# Patient Record
Sex: FEMALE | Race: BLACK OR AFRICAN AMERICAN | NOT HISPANIC OR LATINO | URBAN - METROPOLITAN AREA
[De-identification: names, ages, dates, MRNs, and addresses within clinical notes are randomized per-mention and may not be internally consistent; named-entity substitution may affect disease eponyms.]

---

## 2017-04-15 ENCOUNTER — EMERGENCY (EMERGENCY)
Facility: HOSPITAL | Age: 62
LOS: 1 days | Discharge: HOME | End: 2017-04-15
Attending: EMERGENCY MEDICINE
Payer: MEDICAID

## 2017-04-15 VITALS
HEART RATE: 76 BPM | DIASTOLIC BLOOD PRESSURE: 78 MMHG | SYSTOLIC BLOOD PRESSURE: 118 MMHG | TEMPERATURE: 99 F | OXYGEN SATURATION: 100 % | RESPIRATION RATE: 16 BRPM | WEIGHT: 199.96 LBS | HEIGHT: 67 IN

## 2017-04-15 DIAGNOSIS — R07.81 PLEURODYNIA: ICD-10-CM

## 2017-04-15 DIAGNOSIS — Z79.899 OTHER LONG TERM (CURRENT) DRUG THERAPY: ICD-10-CM

## 2017-04-15 DIAGNOSIS — R05 COUGH: ICD-10-CM

## 2017-04-15 DIAGNOSIS — R07.9 CHEST PAIN, UNSPECIFIED: ICD-10-CM

## 2017-04-15 DIAGNOSIS — Z98.890 OTHER SPECIFIED POSTPROCEDURAL STATES: ICD-10-CM

## 2017-04-15 DIAGNOSIS — R06.00 DYSPNEA, UNSPECIFIED: ICD-10-CM

## 2017-04-15 LAB
ALBUMIN SERPL ELPH-MCNC: 4.6 G/DL — SIGNIFICANT CHANGE UP (ref 3.3–5.2)
ALP SERPL-CCNC: 97 U/L — SIGNIFICANT CHANGE UP (ref 40–120)
ALT FLD-CCNC: 19 U/L — SIGNIFICANT CHANGE UP
ANION GAP SERPL CALC-SCNC: 12 MMOL/L — SIGNIFICANT CHANGE UP (ref 5–17)
AST SERPL-CCNC: 25 U/L — SIGNIFICANT CHANGE UP
BASOPHILS # BLD AUTO: 0 K/UL — SIGNIFICANT CHANGE UP (ref 0–0.2)
BASOPHILS NFR BLD AUTO: 0.5 % — SIGNIFICANT CHANGE UP (ref 0–2)
BILIRUB SERPL-MCNC: 0.2 MG/DL — LOW (ref 0.4–2)
BUN SERPL-MCNC: 18 MG/DL — SIGNIFICANT CHANGE UP (ref 8–20)
CALCIUM SERPL-MCNC: 9.3 MG/DL — SIGNIFICANT CHANGE UP (ref 8.6–10.2)
CHLORIDE SERPL-SCNC: 100 MMOL/L — SIGNIFICANT CHANGE UP (ref 98–107)
CK SERPL-CCNC: 305 U/L — HIGH (ref 25–170)
CO2 SERPL-SCNC: 28 MMOL/L — SIGNIFICANT CHANGE UP (ref 22–29)
CREAT SERPL-MCNC: 0.85 MG/DL — SIGNIFICANT CHANGE UP (ref 0.5–1.3)
EOSINOPHIL # BLD AUTO: 0.1 K/UL — SIGNIFICANT CHANGE UP (ref 0–0.5)
EOSINOPHIL NFR BLD AUTO: 1.6 % — SIGNIFICANT CHANGE UP (ref 0–6)
GLUCOSE SERPL-MCNC: 102 MG/DL — SIGNIFICANT CHANGE UP (ref 70–115)
HCT VFR BLD CALC: 33.5 % — LOW (ref 37–47)
HGB BLD-MCNC: 11.3 G/DL — LOW (ref 12–16)
LYMPHOCYTES # BLD AUTO: 1 K/UL — SIGNIFICANT CHANGE UP (ref 1–4.8)
LYMPHOCYTES # BLD AUTO: 17.2 % — LOW (ref 20–55)
MCHC RBC-ENTMCNC: 28 PG — SIGNIFICANT CHANGE UP (ref 27–31)
MCHC RBC-ENTMCNC: 33.7 G/DL — SIGNIFICANT CHANGE UP (ref 32–36)
MCV RBC AUTO: 83.1 FL — SIGNIFICANT CHANGE UP (ref 81–99)
MONOCYTES # BLD AUTO: 0.5 K/UL — SIGNIFICANT CHANGE UP (ref 0–0.8)
MONOCYTES NFR BLD AUTO: 8.9 % — SIGNIFICANT CHANGE UP (ref 3–10)
NEUTROPHILS # BLD AUTO: 4.4 K/UL — SIGNIFICANT CHANGE UP (ref 1.8–8)
NEUTROPHILS NFR BLD AUTO: 71.8 % — SIGNIFICANT CHANGE UP (ref 37–73)
PLATELET # BLD AUTO: 293 K/UL — SIGNIFICANT CHANGE UP (ref 150–400)
POTASSIUM SERPL-MCNC: 3.6 MMOL/L — SIGNIFICANT CHANGE UP (ref 3.5–5.3)
POTASSIUM SERPL-SCNC: 3.6 MMOL/L — SIGNIFICANT CHANGE UP (ref 3.5–5.3)
PROT SERPL-MCNC: 7.8 G/DL — SIGNIFICANT CHANGE UP (ref 6.6–8.7)
RBC # BLD: 4.03 M/UL — LOW (ref 4.4–5.2)
RBC # FLD: 15.2 % — SIGNIFICANT CHANGE UP (ref 11–15.6)
SODIUM SERPL-SCNC: 140 MMOL/L — SIGNIFICANT CHANGE UP (ref 135–145)
TROPONIN T SERPL-MCNC: <0.01 NG/ML — SIGNIFICANT CHANGE UP (ref 0–0.06)
WBC # BLD: 6.1 K/UL — SIGNIFICANT CHANGE UP (ref 4.8–10.8)
WBC # FLD AUTO: 6.1 K/UL — SIGNIFICANT CHANGE UP (ref 4.8–10.8)

## 2017-04-15 PROCEDURE — 71020: CPT | Mod: 26

## 2017-04-15 PROCEDURE — 99285 EMERGENCY DEPT VISIT HI MDM: CPT | Mod: 25

## 2017-04-15 PROCEDURE — 93010 ELECTROCARDIOGRAM REPORT: CPT

## 2017-04-15 RX ORDER — KETOROLAC TROMETHAMINE 30 MG/ML
30 SYRINGE (ML) INJECTION ONCE
Qty: 0 | Refills: 0 | Status: DISCONTINUED | OUTPATIENT
Start: 2017-04-15 | End: 2017-04-15

## 2017-04-15 RX ORDER — SODIUM CHLORIDE 9 MG/ML
3 INJECTION INTRAMUSCULAR; INTRAVENOUS; SUBCUTANEOUS ONCE
Qty: 0 | Refills: 0 | Status: COMPLETED | OUTPATIENT
Start: 2017-04-15 | End: 2017-04-15

## 2017-04-15 RX ADMIN — SODIUM CHLORIDE 3 MILLILITER(S): 9 INJECTION INTRAMUSCULAR; INTRAVENOUS; SUBCUTANEOUS at 23:39

## 2017-04-15 RX ADMIN — Medication 30 MILLIGRAM(S): at 23:38

## 2017-04-15 RX ADMIN — Medication 100 MILLIGRAM(S): at 23:44

## 2017-04-15 NOTE — ED PROVIDER NOTE - PROGRESS NOTE DETAILS
CXR ROLAND, CE neg.  Pt improved with meds and stable for d/c.  Rx Ibu 600 mg and Tessalon Perles with f/u PMD/Dr. Sanches next 1-2 days

## 2017-04-15 NOTE — ED PROVIDER NOTE - NS ED MD SCRIBE ATTENDING SCRIBE SECTIONS
PAST MEDICAL/SURGICAL/SOCIAL HISTORY/CONSULTATIONS/SHIFT CHANGE/RESULTS/PHYSICAL EXAM/PROGRESS NOTE/INTAKE ASSESSMENT/SCREENINGS/VITAL SIGNS( Pullset)/HIV/HISTORY OF PRESENT ILLNESS/REVIEW OF SYSTEMS/DISPOSITION

## 2017-04-15 NOTE — ED ADULT NURSE NOTE - OBJECTIVE STATEMENT
Pt c/o bilat cp x 2 days.  Chest is tender to palp and worse w movement.  Pt reports worsening productive cough x 2 days.  No acute distress noted.

## 2017-04-15 NOTE — ED ADULT TRIAGE NOTE - CHIEF COMPLAINT QUOTE
generalized cp with cough x few days. pt has no other complaints at this time.  a and o x3. breathing even and unlabored.

## 2017-04-15 NOTE — ED PROVIDER NOTE - OBJECTIVE STATEMENT
60 y/o F with no cardiac history and no COPD presents to the ED c/o chest pain that onset 2 days ago. Pt states the chest pain is aggravated   after coughing and it radiates to her R shoulder. Pt states HARTLEY, yellow phlegm, throbbing R arm, being kept up at night. Pt denies sick contact, fever, chills, diaphoresis, leg edema. No other complaints at this time.

## 2017-04-16 VITALS
TEMPERATURE: 98 F | OXYGEN SATURATION: 100 % | RESPIRATION RATE: 14 BRPM | SYSTOLIC BLOOD PRESSURE: 113 MMHG | DIASTOLIC BLOOD PRESSURE: 70 MMHG | HEART RATE: 79 BPM

## 2017-04-16 LAB — CK MB CFR SERPL CALC: 2.3 NG/ML — SIGNIFICANT CHANGE UP (ref 0–6.7)

## 2017-04-16 PROCEDURE — 82550 ASSAY OF CK (CPK): CPT

## 2017-04-16 PROCEDURE — 96374 THER/PROPH/DIAG INJ IV PUSH: CPT

## 2017-04-16 PROCEDURE — 85027 COMPLETE CBC AUTOMATED: CPT

## 2017-04-16 PROCEDURE — 84484 ASSAY OF TROPONIN QUANT: CPT

## 2017-04-16 PROCEDURE — 80053 COMPREHEN METABOLIC PANEL: CPT

## 2017-04-16 PROCEDURE — 82553 CREATINE MB FRACTION: CPT

## 2017-04-16 PROCEDURE — 71046 X-RAY EXAM CHEST 2 VIEWS: CPT

## 2017-04-16 PROCEDURE — 93005 ELECTROCARDIOGRAM TRACING: CPT

## 2017-04-16 PROCEDURE — 99284 EMERGENCY DEPT VISIT MOD MDM: CPT | Mod: 25

## 2017-04-16 RX ORDER — IBUPROFEN 200 MG
1 TABLET ORAL
Qty: 20 | Refills: 0
Start: 2017-04-16 | End: 2017-04-21

## 2017-04-16 RX ADMIN — Medication 100 MILLIGRAM(S): at 03:37

## 2017-04-16 RX ADMIN — Medication 30 MILLIGRAM(S): at 00:30

## 2017-05-06 ENCOUNTER — EMERGENCY (EMERGENCY)
Facility: HOSPITAL | Age: 62
LOS: 1 days | Discharge: HOME | End: 2017-05-06
Attending: EMERGENCY MEDICINE
Payer: MEDICAID

## 2017-05-06 VITALS
TEMPERATURE: 98 F | HEIGHT: 67 IN | OXYGEN SATURATION: 100 % | WEIGHT: 199.96 LBS | SYSTOLIC BLOOD PRESSURE: 119 MMHG | DIASTOLIC BLOOD PRESSURE: 74 MMHG | RESPIRATION RATE: 18 BRPM | HEART RATE: 73 BPM

## 2017-05-06 VITALS
HEART RATE: 63 BPM | OXYGEN SATURATION: 99 % | SYSTOLIC BLOOD PRESSURE: 118 MMHG | RESPIRATION RATE: 20 BRPM | DIASTOLIC BLOOD PRESSURE: 83 MMHG | TEMPERATURE: 98 F

## 2017-05-06 DIAGNOSIS — S39.92XA UNSPECIFIED INJURY OF LOWER BACK, INITIAL ENCOUNTER: ICD-10-CM

## 2017-05-06 DIAGNOSIS — Z90.49 ACQUIRED ABSENCE OF OTHER SPECIFIED PARTS OF DIGESTIVE TRACT: ICD-10-CM

## 2017-05-06 DIAGNOSIS — S39.022A: ICD-10-CM
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
## 2017-05-06 DIAGNOSIS — Z79.899 OTHER LONG TERM (CURRENT) DRUG THERAPY: ICD-10-CM
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
## 2017-05-06 PROCEDURE — 99284 EMERGENCY DEPT VISIT MOD MDM: CPT

## 2017-05-06 PROCEDURE — 72100 X-RAY EXAM L-S SPINE 2/3 VWS: CPT | Mod: 26

## 2017-05-06 PROCEDURE — 99283 EMERGENCY DEPT VISIT LOW MDM: CPT | Mod: 25

## 2017-05-06 PROCEDURE — 96372 THER/PROPH/DIAG INJ SC/IM: CPT

## 2017-05-06 PROCEDURE — 72100 X-RAY EXAM L-S SPINE 2/3 VWS: CPT

## 2017-05-06 RX ORDER — METHOCARBAMOL 500 MG/1
1000 TABLET, FILM COATED ORAL ONCE
Qty: 0 | Refills: 0 | Status: COMPLETED | OUTPATIENT
Start: 2017-05-06 | End: 2017-05-06

## 2017-05-06 RX ORDER — KETOROLAC TROMETHAMINE 30 MG/ML
60 SYRINGE (ML) INJECTION ONCE
Qty: 0 | Refills: 0 | Status: DISCONTINUED | OUTPATIENT
Start: 2017-05-06 | End: 2017-05-06

## 2017-05-06 RX ORDER — METHOCARBAMOL 500 MG/1
2 TABLET, FILM COATED ORAL
Qty: 24 | Refills: 0
Start: 2017-05-06 | End: 2017-05-10

## 2017-05-06 RX ADMIN — Medication 60 MILLIGRAM(S): at 18:50

## 2017-05-06 RX ADMIN — Medication 40 MILLIGRAM(S): at 21:17

## 2017-05-06 RX ADMIN — Medication 60 MILLIGRAM(S): at 19:42

## 2017-05-06 RX ADMIN — METHOCARBAMOL 1000 MILLIGRAM(S): 500 TABLET, FILM COATED ORAL at 18:50

## 2017-05-06 NOTE — ED ADULT TRIAGE NOTE - CHIEF COMPLAINT QUOTE
Patient arrived on stretcher via EMS, awake, alert, and oriented times 3, breathing unlabored.  As per patient, "I bent over and heard a crack in my lower back and I am now having a lower back spasm"  patient unable to walk due to pain

## 2017-05-06 NOTE — ED ADULT NURSE NOTE - OBJECTIVE STATEMENT
Patient reports when bending over she felt pop in lower back and now cant walk. Patient reports severe lower back pain.

## 2017-05-06 NOTE — ED PROVIDER NOTE - OBJECTIVE STATEMENT
Pt presents with lower back pain. She bent over to get something from the fridge and as she stool up felt a crack in her lower back area. She now has spasms that are localized to that area. No leg weakness, no bladder or bowel issues.

## 2017-05-06 NOTE — ED PROVIDER NOTE - NS ED ROS FT
Review of Systems:  	•	CONSTITUTIONAL : no fever or weight change  	•	SKIN : no rash  	•	HEMATOLOGIC : no petechia, no bruising  	•	EYES : no eye pain, no blurred vision  	•	ENT : no change in hearing, no sore throat  	•	RESPIRATORY : no shortness of breath, no cough  	•	CARDIAC : no chest pain, no palpitations  	•	GI : no abd pain, no nausea, no vomiting, no diarrhea, no constipation, no bleeding   	•	MUSCULOSKELETAL : + back pain  	•	NEUROLOGIC : no weakness, no headache, no anesthesia, no paresthesias

## 2017-05-07 RX ORDER — OXYCODONE AND ACETAMINOPHEN 5; 325 MG/1; MG/1
1 TABLET ORAL
Qty: 6 | Refills: 0
Start: 2017-05-07 | End: 2017-05-09

## 2018-01-01 NOTE — ED PROVIDER NOTE - NEUROLOGICAL, MLM
Alert and oriented, no focal deficits, no motor or sensory deficits. I will STOP taking the medications listed below when I get home from the hospital:  None

## 2018-01-19 ENCOUNTER — APPOINTMENT (OUTPATIENT)
Dept: GASTROENTEROLOGY | Facility: CLINIC | Age: 63
End: 2018-01-19
Payer: COMMERCIAL

## 2018-01-19 VITALS
SYSTOLIC BLOOD PRESSURE: 130 MMHG | WEIGHT: 195 LBS | BODY MASS INDEX: 30.61 KG/M2 | HEIGHT: 67 IN | HEART RATE: 80 BPM | DIASTOLIC BLOOD PRESSURE: 82 MMHG | RESPIRATION RATE: 16 BRPM | OXYGEN SATURATION: 100 %

## 2018-01-19 DIAGNOSIS — F55.2 ABUSE OF LAXATIVES: ICD-10-CM

## 2018-01-19 PROCEDURE — 99203 OFFICE O/P NEW LOW 30 MIN: CPT

## 2018-01-19 RX ORDER — SERTRALINE HYDROCHLORIDE 50 MG/1
50 TABLET, FILM COATED ORAL
Qty: 45 | Refills: 0 | Status: ACTIVE | COMMUNITY
Start: 2017-12-05

## 2018-01-19 RX ORDER — POLYETHYLENE GLYCOL 3350, SODIUM SULFATE, SODIUM CHLORIDE, POTASSIUM CHLORIDE, ASCORBIC ACID, SODIUM ASCORBATE 7.5-2.691G
100 KIT ORAL
Qty: 1 | Refills: 0 | Status: ACTIVE | COMMUNITY
Start: 2018-01-19 | End: 1900-01-01

## 2018-01-22 LAB
ANION GAP SERPL CALC-SCNC: 15 MMOL/L
BASOPHILS # BLD AUTO: 0.04 K/UL
BASOPHILS NFR BLD AUTO: 0.8 %
BUN SERPL-MCNC: 14 MG/DL
CALCIUM SERPL-MCNC: 9.7 MG/DL
CHLORIDE SERPL-SCNC: 103 MMOL/L
CO2 SERPL-SCNC: 25 MMOL/L
CREAT SERPL-MCNC: 0.82 MG/DL
EOSINOPHIL # BLD AUTO: 0.03 K/UL
EOSINOPHIL NFR BLD AUTO: 0.6 %
GLUCOSE SERPL-MCNC: 85 MG/DL
HCT VFR BLD CALC: 37.4 %
HGB BLD-MCNC: 11.8 G/DL
IMM GRANULOCYTES NFR BLD AUTO: 0.4 %
INR PPP: 1.03 RATIO
LYMPHOCYTES # BLD AUTO: 1.64 K/UL
LYMPHOCYTES NFR BLD AUTO: 32.3 %
MAN DIFF?: NORMAL
MCHC RBC-ENTMCNC: 27.1 PG
MCHC RBC-ENTMCNC: 31.6 GM/DL
MCV RBC AUTO: 85.8 FL
MONOCYTES # BLD AUTO: 0.39 K/UL
MONOCYTES NFR BLD AUTO: 7.7 %
NEUTROPHILS # BLD AUTO: 2.96 K/UL
NEUTROPHILS NFR BLD AUTO: 58.2 %
PLATELET # BLD AUTO: 297 K/UL
POTASSIUM SERPL-SCNC: 4.6 MMOL/L
PT BLD: 11.6 SEC
RBC # BLD: 4.36 M/UL
RBC # FLD: 15.2 %
SODIUM SERPL-SCNC: 143 MMOL/L
WBC # FLD AUTO: 5.08 K/UL

## 2018-02-03 ENCOUNTER — TRANSCRIPTION ENCOUNTER (OUTPATIENT)
Age: 63
End: 2018-02-03

## 2018-05-15 ENCOUNTER — APPOINTMENT (OUTPATIENT)
Dept: GASTROENTEROLOGY | Facility: GI CENTER | Age: 63
End: 2018-05-15
Payer: COMMERCIAL

## 2018-05-15 ENCOUNTER — OUTPATIENT (OUTPATIENT)
Dept: OUTPATIENT SERVICES | Facility: HOSPITAL | Age: 63
LOS: 1 days | End: 2018-05-15
Payer: MEDICAID

## 2018-05-15 DIAGNOSIS — Z12.11 ENCOUNTER FOR SCREENING FOR MALIGNANT NEOPLASM OF COLON: ICD-10-CM

## 2018-05-15 PROCEDURE — G0121: CPT

## 2018-05-15 PROCEDURE — 45378 DIAGNOSTIC COLONOSCOPY: CPT

## 2020-01-31 ENCOUNTER — EMERGENCY (EMERGENCY)
Facility: HOSPITAL | Age: 65
LOS: 1 days | Discharge: HOME | End: 2020-01-31
Attending: EMERGENCY MEDICINE
Payer: MEDICAID

## 2020-01-31 VITALS
OXYGEN SATURATION: 97 % | HEART RATE: 77 BPM | RESPIRATION RATE: 18 BRPM | DIASTOLIC BLOOD PRESSURE: 89 MMHG | SYSTOLIC BLOOD PRESSURE: 126 MMHG | TEMPERATURE: 98 F

## 2020-01-31 LAB
ALBUMIN SERPL ELPH-MCNC: 3.8 G/DL — SIGNIFICANT CHANGE UP (ref 3.3–5.2)
ALP SERPL-CCNC: 89 U/L — SIGNIFICANT CHANGE UP (ref 40–120)
ALT FLD-CCNC: <5 U/L — SIGNIFICANT CHANGE UP
ANION GAP SERPL CALC-SCNC: 14 MMOL/L — SIGNIFICANT CHANGE UP (ref 5–17)
AST SERPL-CCNC: 54 U/L — HIGH
BASOPHILS # BLD AUTO: 0.03 K/UL — SIGNIFICANT CHANGE UP (ref 0–0.2)
BASOPHILS NFR BLD AUTO: 0.5 % — SIGNIFICANT CHANGE UP (ref 0–2)
BILIRUB SERPL-MCNC: <0.2 MG/DL — LOW (ref 0.4–2)
BUN SERPL-MCNC: 15 MG/DL — SIGNIFICANT CHANGE UP (ref 8–20)
CALCIUM SERPL-MCNC: 9.2 MG/DL — SIGNIFICANT CHANGE UP (ref 8.6–10.2)
CHLORIDE SERPL-SCNC: 104 MMOL/L — SIGNIFICANT CHANGE UP (ref 98–107)
CO2 SERPL-SCNC: 25 MMOL/L — SIGNIFICANT CHANGE UP (ref 22–29)
CREAT SERPL-MCNC: 0.56 MG/DL — SIGNIFICANT CHANGE UP (ref 0.5–1.3)
EOSINOPHIL # BLD AUTO: 0.02 K/UL — SIGNIFICANT CHANGE UP (ref 0–0.5)
EOSINOPHIL NFR BLD AUTO: 0.3 % — SIGNIFICANT CHANGE UP (ref 0–6)
GLUCOSE SERPL-MCNC: 96 MG/DL — SIGNIFICANT CHANGE UP (ref 70–99)
HCT VFR BLD CALC: 36 % — SIGNIFICANT CHANGE UP (ref 34.5–45)
HGB BLD-MCNC: 11.6 G/DL — SIGNIFICANT CHANGE UP (ref 11.5–15.5)
IMM GRANULOCYTES NFR BLD AUTO: 0.8 % — SIGNIFICANT CHANGE UP (ref 0–1.5)
LYMPHOCYTES # BLD AUTO: 1.93 K/UL — SIGNIFICANT CHANGE UP (ref 1–3.3)
LYMPHOCYTES # BLD AUTO: 31.2 % — SIGNIFICANT CHANGE UP (ref 13–44)
MCHC RBC-ENTMCNC: 27.3 PG — SIGNIFICANT CHANGE UP (ref 27–34)
MCHC RBC-ENTMCNC: 32.2 GM/DL — SIGNIFICANT CHANGE UP (ref 32–36)
MCV RBC AUTO: 84.7 FL — SIGNIFICANT CHANGE UP (ref 80–100)
MONOCYTES # BLD AUTO: 0.46 K/UL — SIGNIFICANT CHANGE UP (ref 0–0.9)
MONOCYTES NFR BLD AUTO: 7.4 % — SIGNIFICANT CHANGE UP (ref 2–14)
NEUTROPHILS # BLD AUTO: 3.69 K/UL — SIGNIFICANT CHANGE UP (ref 1.8–7.4)
NEUTROPHILS NFR BLD AUTO: 59.8 % — SIGNIFICANT CHANGE UP (ref 43–77)
PLATELET # BLD AUTO: 317 K/UL — SIGNIFICANT CHANGE UP (ref 150–400)
POTASSIUM SERPL-MCNC: 4.2 MMOL/L — SIGNIFICANT CHANGE UP (ref 3.5–5.3)
POTASSIUM SERPL-SCNC: 4.2 MMOL/L — SIGNIFICANT CHANGE UP (ref 3.5–5.3)
PROT SERPL-MCNC: 6.9 G/DL — SIGNIFICANT CHANGE UP (ref 6.6–8.7)
RBC # BLD: 4.25 M/UL — SIGNIFICANT CHANGE UP (ref 3.8–5.2)
RBC # FLD: 14.6 % — HIGH (ref 10.3–14.5)
SODIUM SERPL-SCNC: 143 MMOL/L — SIGNIFICANT CHANGE UP (ref 135–145)
TROPONIN T SERPL-MCNC: <0.01 NG/ML — SIGNIFICANT CHANGE UP (ref 0–0.06)
WBC # BLD: 6.18 K/UL — SIGNIFICANT CHANGE UP (ref 3.8–10.5)
WBC # FLD AUTO: 6.18 K/UL — SIGNIFICANT CHANGE UP (ref 3.8–10.5)

## 2020-01-31 PROCEDURE — 71046 X-RAY EXAM CHEST 2 VIEWS: CPT

## 2020-01-31 PROCEDURE — 36415 COLL VENOUS BLD VENIPUNCTURE: CPT

## 2020-01-31 PROCEDURE — 84484 ASSAY OF TROPONIN QUANT: CPT

## 2020-01-31 PROCEDURE — 71046 X-RAY EXAM CHEST 2 VIEWS: CPT | Mod: 26

## 2020-01-31 PROCEDURE — 99283 EMERGENCY DEPT VISIT LOW MDM: CPT | Mod: 25

## 2020-01-31 PROCEDURE — 80053 COMPREHEN METABOLIC PANEL: CPT

## 2020-01-31 PROCEDURE — 99285 EMERGENCY DEPT VISIT HI MDM: CPT

## 2020-01-31 PROCEDURE — 85027 COMPLETE CBC AUTOMATED: CPT

## 2020-01-31 PROCEDURE — 96372 THER/PROPH/DIAG INJ SC/IM: CPT

## 2020-01-31 PROCEDURE — 94640 AIRWAY INHALATION TREATMENT: CPT

## 2020-01-31 RX ORDER — PSEUDOEPHEDRINE HCL 30 MG
30 TABLET ORAL ONCE
Refills: 0 | Status: COMPLETED | OUTPATIENT
Start: 2020-01-31 | End: 2020-01-31

## 2020-01-31 RX ORDER — FLUTICASONE PROPIONATE 50 MCG
1 SPRAY, SUSPENSION NASAL
Qty: 1 | Refills: 0
Start: 2020-01-31 | End: 2020-02-29

## 2020-01-31 RX ORDER — ALBUTEROL 90 UG/1
2 AEROSOL, METERED ORAL
Qty: 1 | Refills: 0
Start: 2020-01-31 | End: 2020-02-29

## 2020-01-31 RX ORDER — ALBUTEROL 90 UG/1
2 AEROSOL, METERED ORAL ONCE
Refills: 0 | Status: COMPLETED | OUTPATIENT
Start: 2020-01-31 | End: 2020-01-31

## 2020-01-31 RX ORDER — KETOROLAC TROMETHAMINE 30 MG/ML
30 SYRINGE (ML) INJECTION ONCE
Refills: 0 | Status: DISCONTINUED | OUTPATIENT
Start: 2020-01-31 | End: 2020-01-31

## 2020-01-31 RX ORDER — SODIUM CHLORIDE 9 MG/ML
1000 INJECTION INTRAMUSCULAR; INTRAVENOUS; SUBCUTANEOUS ONCE
Refills: 0 | Status: COMPLETED | OUTPATIENT
Start: 2020-01-31 | End: 2020-01-31

## 2020-01-31 RX ADMIN — ALBUTEROL 2 PUFF(S): 90 AEROSOL, METERED ORAL at 21:20

## 2020-01-31 RX ADMIN — SODIUM CHLORIDE 1000 MILLILITER(S): 9 INJECTION INTRAMUSCULAR; INTRAVENOUS; SUBCUTANEOUS at 19:55

## 2020-01-31 RX ADMIN — Medication 30 MILLIGRAM(S): at 21:17

## 2020-01-31 RX ADMIN — Medication 30 MILLIGRAM(S): at 19:56

## 2020-01-31 NOTE — ED ADULT TRIAGE NOTE - CHIEF COMPLAINT QUOTE
Patient BIBA, states dizziness and URI for past week, was given medication for URI from MD and completed it, states received more medication for URI and is "too strong"

## 2020-01-31 NOTE — ED ADULT NURSE NOTE - OBJECTIVE STATEMENT
Patient presents to ER for medical evaluation, reports she was recently diagnosed with URI and completed run of ABX, Patient presents to ER for medical evaluation, reports she was recently diagnosed with URI and completed run of ABX and steroids, patient states symptoms actually worsened, resp even/unlabored, lungs CTAB.

## 2020-01-31 NOTE — ED PROVIDER NOTE - OBJECTIVE STATEMENT
63 y/o F, with PMHx of Vertigo, pt presents to the ED c/o cold symptoms. Pt reports that she went to PMD 2 weeks ago, Dx with URI, Rx steroids and antibiotics that she finished, but experienced worsening symptoms. Pt went to PMD again yesterday, Rx Antihistamine, but still worsened. Also reports generalized weakness, body aches, fever, cough, and dizziness. Notes L neck pain that she says feels like a swollen lymph node.

## 2020-01-31 NOTE — ED PROVIDER NOTE - PATIENT PORTAL LINK FT
You can access the FollowMyHealth Patient Portal offered by [facility] by registering at the following website: [email or web address]. By joining [employer]’s FollowMyHealth portal, you will also be able to view your health information using other applications (apps) compatible with our system.

## 2020-01-31 NOTE — ED PROVIDER NOTE - CLINICAL SUMMARY MEDICAL DECISION MAKING FREE TEXT BOX
Pt well appearing, no increased resp effort, no fever or leukocytosis. Likely URI, stable for dc and supportive care

## 2020-12-16 PROBLEM — Z12.11 ENCOUNTER FOR SCREENING COLONOSCOPY: Status: RESOLVED | Noted: 2018-01-19 | Resolved: 2020-12-16

## 2022-05-19 NOTE — ED ADULT TRIAGE NOTE - PRO INTERPRETER NEED 2
From: Valerie Augustine  To: Bettie Carrasco  Sent: 5/19/2022 7:38 AM CDT  Subject: Dermatologist     Good morning, Dr. Carrasco and I talked about sending a referral to [facility]. I’d like to go ahead with the referral request. Thank you.  Valerie    English
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
## 2022-09-19 NOTE — ED PROVIDER NOTE - RESPIRATORY [+], MLM
Anesthesia Volume In Cc: 0.5 Post-Care Instructions: I reviewed with the patient in detail post-care instructions. Patient is to wear sunprotection, and avoid picking at any of the treated lesions. Pt may apply Vaseline to crusted or scabbing areas. Detail Level: Detailed Price (Use Numbers Only, No Special Characters Or $): 150 Consent: The patient's consent was obtained including but not limited to risks of crusting, scabbing, blistering, scarring, darker or lighter pigmentary change, recurrence, incomplete removal and infection. COUGH

## 2022-10-11 ENCOUNTER — NON-APPOINTMENT (OUTPATIENT)
Age: 67
End: 2022-10-11

## 2022-10-20 ENCOUNTER — EMERGENCY (EMERGENCY)
Facility: HOSPITAL | Age: 67
LOS: 1 days | Discharge: HOME | End: 2022-10-20
Attending: EMERGENCY MEDICINE
Payer: MEDICAID

## 2022-10-20 VITALS
HEIGHT: 67 IN | DIASTOLIC BLOOD PRESSURE: 74 MMHG | OXYGEN SATURATION: 98 % | HEART RATE: 74 BPM | TEMPERATURE: 98 F | SYSTOLIC BLOOD PRESSURE: 125 MMHG | WEIGHT: 179.9 LBS | RESPIRATION RATE: 18 BRPM

## 2022-10-20 PROCEDURE — 87529 HSV DNA AMP PROBE: CPT

## 2022-10-20 PROCEDURE — 87593 ORTHOPOXVIRUS AMP PRB EACH: CPT

## 2022-10-20 PROCEDURE — 99284 EMERGENCY DEPT VISIT MOD MDM: CPT

## 2022-10-20 PROCEDURE — 87798 DETECT AGENT NOS DNA AMP: CPT

## 2022-10-20 PROCEDURE — 99283 EMERGENCY DEPT VISIT LOW MDM: CPT
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
## 2022-10-20 NOTE — ED PROVIDER NOTE - PHYSICAL EXAMINATION
General:     NAD, well-nourished, well-appearing  Head:     NC/AT, EOMI, oral mucosa moist  Neck:     trachea midline  Lungs:     CTA b/l, no w/r/r  CVS:     S1S2, RRR, no m/g/r  Abd:     +BS, s/nt/nd, no organomegaly  Ext:    2+ radial and pedal pulses, no c/c/e  Neuro: AAOx3, no sensory/motor deficits   Skin: (+) vesicular lesions under bilateral breasts, left axilla, left lateral neck. Painful, perlitic, with surrounding erythema General:     NAD, well-nourished, well-appearing  Head:     NC/AT, EOMI, oral mucosa moist  Neck:     trachea midline  Lungs:     CTA b/l, no w/r/r  CVS:     S1S2, RRR, no m/g/r  Abd:     +BS, s/nt/nd, no organomegaly  Ext:    2+ radial and pedal pulses, no c/c/e  Neuro: grossly itnact  Skin: (+) circumferential dark lesions 1cm in diamter, some vesicular lesions under bilateral breasts, left axilla, left lateral neck. tender, erythematous
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
## 2022-10-20 NOTE — ED PROVIDER NOTE - PATIENT PORTAL LINK FT
You can access the FollowMyHealth Patient Portal offered by [facility] by registering at the following website: [email or web address]. By joining [employer]’s FollowMyHealth portal, you will also be able to view your health information using other applications (apps) compatible with our system.

## 2022-10-21 LAB
HSV+VZV DNA SPEC QL NAA+PROBE: SIGNIFICANT CHANGE UP
NONVAR ORTHPX DNA SPEC QL NAA+PROBE: SIGNIFICANT CHANGE UP
SPECIMEN SOURCE: SIGNIFICANT CHANGE UP

## 2022-10-25 LAB — NONVAR ORTHPX DNA SPEC QL NAA+PROBE: SIGNIFICANT CHANGE UP

## 2023-02-09 ENCOUNTER — EMERGENCY (EMERGENCY)
Facility: HOSPITAL | Age: 68
LOS: 1 days | Discharge: HOME | End: 2023-02-09
Attending: EMERGENCY MEDICINE
Payer: MEDICARE

## 2023-02-09 VITALS
HEART RATE: 68 BPM | DIASTOLIC BLOOD PRESSURE: 85 MMHG | RESPIRATION RATE: 16 BRPM | HEIGHT: 67 IN | WEIGHT: 199.96 LBS | SYSTOLIC BLOOD PRESSURE: 139 MMHG | TEMPERATURE: 98 F | OXYGEN SATURATION: 99 %

## 2023-02-09 LAB
ANION GAP SERPL CALC-SCNC: 8 MMOL/L — SIGNIFICANT CHANGE UP (ref 5–17)
BASOPHILS # BLD AUTO: 0.03 K/UL — SIGNIFICANT CHANGE UP (ref 0–0.2)
BASOPHILS NFR BLD AUTO: 0.5 % — SIGNIFICANT CHANGE UP (ref 0–2)
BUN SERPL-MCNC: 11.1 MG/DL — SIGNIFICANT CHANGE UP (ref 8–20)
CALCIUM SERPL-MCNC: 9.3 MG/DL — SIGNIFICANT CHANGE UP (ref 8.4–10.5)
CHLORIDE SERPL-SCNC: 103 MMOL/L — SIGNIFICANT CHANGE UP (ref 96–108)
CO2 SERPL-SCNC: 30 MMOL/L — HIGH (ref 22–29)
CREAT SERPL-MCNC: 0.64 MG/DL — SIGNIFICANT CHANGE UP (ref 0.5–1.3)
EGFR: 98 ML/MIN/1.73M2 — SIGNIFICANT CHANGE UP
EOSINOPHIL # BLD AUTO: 0.05 K/UL — SIGNIFICANT CHANGE UP (ref 0–0.5)
EOSINOPHIL NFR BLD AUTO: 0.9 % — SIGNIFICANT CHANGE UP (ref 0–6)
GLUCOSE SERPL-MCNC: 93 MG/DL — SIGNIFICANT CHANGE UP (ref 70–99)
HCT VFR BLD CALC: 36.9 % — SIGNIFICANT CHANGE UP (ref 34.5–45)
HGB BLD-MCNC: 11.5 G/DL — SIGNIFICANT CHANGE UP (ref 11.5–15.5)
IMM GRANULOCYTES NFR BLD AUTO: 0.5 % — SIGNIFICANT CHANGE UP (ref 0–0.9)
LYMPHOCYTES # BLD AUTO: 1.7 K/UL — SIGNIFICANT CHANGE UP (ref 1–3.3)
LYMPHOCYTES # BLD AUTO: 29.2 % — SIGNIFICANT CHANGE UP (ref 13–44)
MAGNESIUM SERPL-MCNC: 2.1 MG/DL — SIGNIFICANT CHANGE UP (ref 1.6–2.6)
MCHC RBC-ENTMCNC: 26.7 PG — LOW (ref 27–34)
MCHC RBC-ENTMCNC: 31.2 GM/DL — LOW (ref 32–36)
MCV RBC AUTO: 85.6 FL — SIGNIFICANT CHANGE UP (ref 80–100)
MONOCYTES # BLD AUTO: 0.43 K/UL — SIGNIFICANT CHANGE UP (ref 0–0.9)
MONOCYTES NFR BLD AUTO: 7.4 % — SIGNIFICANT CHANGE UP (ref 2–14)
NEUTROPHILS # BLD AUTO: 3.59 K/UL — SIGNIFICANT CHANGE UP (ref 1.8–7.4)
NEUTROPHILS NFR BLD AUTO: 61.5 % — SIGNIFICANT CHANGE UP (ref 43–77)
PLATELET # BLD AUTO: 290 K/UL — SIGNIFICANT CHANGE UP (ref 150–400)
POTASSIUM SERPL-MCNC: 4 MMOL/L — SIGNIFICANT CHANGE UP (ref 3.5–5.3)
POTASSIUM SERPL-SCNC: 4 MMOL/L — SIGNIFICANT CHANGE UP (ref 3.5–5.3)
RBC # BLD: 4.31 M/UL — SIGNIFICANT CHANGE UP (ref 3.8–5.2)
RBC # FLD: 14.6 % — HIGH (ref 10.3–14.5)
SODIUM SERPL-SCNC: 141 MMOL/L — SIGNIFICANT CHANGE UP (ref 135–145)
WBC # BLD: 5.83 K/UL — SIGNIFICANT CHANGE UP (ref 3.8–10.5)
WBC # FLD AUTO: 5.83 K/UL — SIGNIFICANT CHANGE UP (ref 3.8–10.5)

## 2023-02-09 PROCEDURE — 99223 1ST HOSP IP/OBS HIGH 75: CPT

## 2023-02-09 PROCEDURE — 70496 CT ANGIOGRAPHY HEAD: CPT | Mod: 26,MA

## 2023-02-09 PROCEDURE — 93010 ELECTROCARDIOGRAM REPORT: CPT

## 2023-02-09 PROCEDURE — 70498 CT ANGIOGRAPHY NECK: CPT | Mod: 26,MA

## 2023-02-09 RX ORDER — SERTRALINE 25 MG/1
50 TABLET, FILM COATED ORAL DAILY
Refills: 0 | Status: DISCONTINUED | OUTPATIENT
Start: 2023-02-09 | End: 2023-02-17

## 2023-02-09 RX ORDER — MECLIZINE HCL 12.5 MG
25 TABLET ORAL THREE TIMES A DAY
Refills: 0 | Status: DISCONTINUED | OUTPATIENT
Start: 2023-02-09 | End: 2023-02-17

## 2023-02-09 RX ORDER — KETOROLAC TROMETHAMINE 30 MG/ML
15 SYRINGE (ML) INJECTION ONCE
Refills: 0 | Status: DISCONTINUED | OUTPATIENT
Start: 2023-02-09 | End: 2023-02-09

## 2023-02-09 RX ORDER — DIPHENHYDRAMINE HCL 50 MG
25 CAPSULE ORAL ONCE
Refills: 0 | Status: DISCONTINUED | OUTPATIENT
Start: 2023-02-09 | End: 2023-02-17

## 2023-02-09 RX ORDER — METOCLOPRAMIDE HCL 10 MG
10 TABLET ORAL ONCE
Refills: 0 | Status: COMPLETED | OUTPATIENT
Start: 2023-02-09 | End: 2023-02-09

## 2023-02-09 RX ORDER — SERTRALINE 25 MG/1
50 TABLET, FILM COATED ORAL
Qty: 0 | Refills: 0 | DISCHARGE

## 2023-02-09 RX ORDER — ACETAMINOPHEN 500 MG
650 TABLET ORAL ONCE
Refills: 0 | Status: COMPLETED | OUTPATIENT
Start: 2023-02-09 | End: 2023-02-09

## 2023-02-09 RX ORDER — CARBAMIDE PEROXIDE 81.86 MG/ML
1 SOLUTION/ DROPS AURICULAR (OTIC)
Refills: 0 | Status: DISCONTINUED | OUTPATIENT
Start: 2023-02-09 | End: 2023-02-17

## 2023-02-09 RX ADMIN — Medication 15 MILLIGRAM(S): at 20:27

## 2023-02-09 RX ADMIN — Medication 15 MILLIGRAM(S): at 19:57

## 2023-02-09 RX ADMIN — Medication 10 MILLIGRAM(S): at 14:54

## 2023-02-09 NOTE — ED CDU PROVIDER INITIAL DAY NOTE - CLINICAL SUMMARY MEDICAL DECISION MAKING FREE TEXT BOX
66 y/o F with hx vertigo, depression presents to ER c/o headaches x 4 days with blurred vision and dizziness. Labs unremarkable, CT brain shows left inferior frontal lobe meningioma. Pt was evaluated by neurosurgery recommending Q4H neuro checks and MRI brain w/wo contrast for further evaluation. Pain control PRN.

## 2023-02-09 NOTE — ED PROVIDER NOTE - PROGRESS NOTE DETAILS
neurosurgery Pankaj: Pt signed out to me by dr. ewing pending neurosurgery eval. neurosurgery recommends mri. will put in obs. case d/w dr. griffin and LY taylor.

## 2023-02-09 NOTE — ED ADULT NURSE NOTE - OBJECTIVE STATEMENT
a&ox4 c/o posterior occipital head pressure &  headache onset monday. pt states "pain is so intense I get dizzy and my eyes get blurry and I need to lay down".  hx vertigo ( on meclizine)   + anxiety + depression, pt anxious upon arrival to ed  denies n/v/loc/head trauma/cp/sob/visual changes/numb/tingling on arrival

## 2023-02-09 NOTE — CONSULT NOTE ADULT - ASSESSMENT
66 y/o F PMH vertigo, depression presenting to [facility] ED with HAs x 4 days with associated dizziness & intermittent episodes of blurry vision.     Plan:  -D/w Dr. Alejandro   -Q4h neuro checks  -MRI brain +/- contrast with brain lab protocol  -Pain control PRN  -SCDs for DVT PPX  -Further plan pending imaging results

## 2023-02-09 NOTE — CONSULT NOTE ADULT - SUBJECTIVE AND OBJECTIVE BOX
Patient is a 67y old  Female who presents with a chief complaint of HA.    HPI: Patient is a 66 y/o F PMH vertigo, depression presenting to [facility] ED with HAs x 4 days. Pt. states she's been waking up with headaches for the past 4 days. She states the headaches are constant and feel like pressure in the back of her head. She also endorses dizziness & intermittent episodes of blurry vision. She is currently at her baseline. Denies AC/AP use, trauma/injury, weakness, numbness/tingling, N/V/D, recent illness, fever.       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  EtOH use: Denies.  Substance: Denies.    Allergies    No Known Allergies    Intolerances      Vital Signs Last 24 Hrs  T(C): 36.6 (09 Feb 2023 15:31), Max: 36.6 (09 Feb 2023 12:59)  T(F): 97.9 (09 Feb 2023 15:31), Max: 97.9 (09 Feb 2023 12:59)  HR: 59 (09 Feb 2023 15:31) (59 - 68)  BP: 128/83 (09 Feb 2023 15:31) (128/83 - 139/85)  BP(mean): --  RR: 18 (09 Feb 2023 15:31) (16 - 18)  SpO2: 98% (09 Feb 2023 15:31) (98% - 99%)    Parameters below as of 09 Feb 2023 15:31  Patient On (Oxygen Delivery Method): room air    PHYSICAL EXAM:  GENERAL: NAD, well-groomed  HEAD:  Atraumatic, normocephalic  EYES: Conjunctiva and sclera clear; corneal reflex intact  ENMT: Moist mucous membranes  NECK: Supple, no JVD, normal thyroid  RICH COMA SCORE: E-4 V-5 M-6 = 15  MENTAL STATUS: AAO x3; Awake; Opens eyes spontaneously; Appropriately conversant without aphasia; following simple commands  CRANIAL NERVES: PERRL. EOMI without nystagmus. Facial sensation intact V1-3 distribution b/l. Face symmetric w/ normal eye closure and smile, tongue midline.   MOTOR: strength 5/5 b/l upper and lower extremities  SENSATION: grossly intact to light touch all extremities  COORDINATION: No upper extremity dysmetria  EXTREMITIES:  2+ peripheral pulses, no clubbing, cyanosis, or edema  SKIN: Warm, dry; no rashes or lesions    LABS:                        11.5   5.83  )-----------( 290      ( 09 Feb 2023 15:10 )             36.9     02-09    141  |  103  |  11.1  ----------------------------<  93  4.0   |  30.0<H>  |  0.64    Ca    9.3      09 Feb 2023 15:10  Mg     2.1     02-09        RADIOLOGY & ADDITIONAL STUDIES:  < from: CT Angio Head w/ IV Cont (02.09.23 @ 17:09) >  IMPRESSION:    Limited study secondary to IV bolus timing. No acute infarction   intracranial hemorrhage. Left inferior frontal lobe meningioma. Consider   contrast-enhanced MRI/MRA of the brain as clinically warranted.    --- End of Report ---            MOY PINEDA MD; Attending Radiologist  This document has been electronically signed. Feb 9 2023  5:15PM    < end of copied text >

## 2023-02-09 NOTE — ED CDU PROVIDER INITIAL DAY NOTE - NS ED ATTENDING STATEMENT MOD
This was a shared visit with the SHILA. I reviewed and verified the documentation and independently performed the documented:

## 2023-02-09 NOTE — ED CDU PROVIDER INITIAL DAY NOTE - PHYSICAL EXAMINATION
Const: Awake, alert and oriented. In no acute distress. Well appearing.  HEENT: NC/AT. Moist mucous membranes.  Eyes: No scleral icterus. EOMI.  Neck:. Soft and supple. Full ROM without pain.  Cardiac: Regular rate and regular rhythm.  Resp: Speaking in full sentences. No evidence of respiratory distress. No wheezes, rales or rhonchi.  Abd: Soft, non-tender, non-distended. Normal bowel sounds in all 4 quadrants. No guarding or rebound.  Back: Spine midline and non-tender. No CVAT.  Skin: No rashes, abrasions or lacerations.  Lymph: No cervical lymphadenopathy.  Neuro: A&Ox3, moving all extremities symmetrically, follows commands, motor adrienne upper and lower ext 5/5, sensory symm and intact CN 2-12 grossly intact, no ataxia, no nystagmus, no dysmetria, no ddk, symm adrienne, no pronator drift

## 2023-02-09 NOTE — ED ADULT TRIAGE NOTE - IDEAL BODY WEIGHT(KG)
Patient needs an appointment with Dr. Eisenberg next week for consult c section     Sai VIDAL      64

## 2023-02-09 NOTE — ED PROVIDER NOTE - OBJECTIVE STATEMENT
68yo female with pmh of vertigo on meclizine and depression on zoloft presents with HA. Pt states for the past 4 days with HA that make her dizzy and have blurry vision, pt states the pain starts occipital then causes her to see blurry and feel dizzy once pain resolved she's back to baseline. Pt denies fevers/chills, loc, focal neuro deficits, cp/sob/palp, cough, abd pain/n/v/d, urinary symptoms, recent travel and sick contacts.

## 2023-02-09 NOTE — ED CDU PROVIDER INITIAL DAY NOTE - OBJECTIVE STATEMENT
68yo female with pmh of vertigo on meclizine and depression on zoloft presents with HA. Pt states for the past 4 days with HA that make her dizzy and have blurry vision, pt states the pain starts occipital then causes her to see blurry and feel dizzy once pain resolved she's back to baseline. Pt denies fevers/chills, loc, focal neuro deficits, cp/sob/palp, cough, abd pain/n/v/d, urinary symptoms, recent travel and sick contacts. states sx feel different from prior vertigo.

## 2023-02-09 NOTE — ED CDU PROVIDER INITIAL DAY NOTE - ATTENDING APP SHARED VISIT CONTRIBUTION OF CARE
I agree with the PA's note and was available for any issues/concerns. I was directly involved in patient care. My brief overall assessment is as follows:    67 year old female PMHx vertigo c/o dizziness; initial work up and neurosurgery assessment noted; admit to obs for MRI

## 2023-02-09 NOTE — ED PROVIDER NOTE - PHYSICAL EXAMINATION
Const: Awake, alert and oriented. In no acute distress. Well appearing.  HEENT: NC/AT. Moist mucous membranes.  Eyes: No scleral icterus. EOMI.  Neck:. Soft and supple. Full ROM without pain.  Cardiac: Regular rate and regular rhythm. +S1/S2. Peripheral pulses 2+ and symmetric. No LE edema.  Resp: Speaking in full sentences. No evidence of respiratory distress. No wheezes, rales or rhonchi.  Abd: Soft, non-tender, non-distended. Normal bowel sounds in all 4 quadrants. No guarding or rebound.  Back: Spine midline and non-tender. No CVAT.  Skin: No rashes, abrasions or lacerations.  Lymph: No cervical lymphadenopathy.  Neuro: A&Ox3, moving all extremities symmetrically, follows commands, motor adrienne upper and lower ext 5/5, sensory symm and intact CN 2-12 grossly intact, no ataxia, no nystagmus, no dysmetria, no ddk, symm adrienne, no pronator drift

## 2023-02-10 VITALS
SYSTOLIC BLOOD PRESSURE: 138 MMHG | DIASTOLIC BLOOD PRESSURE: 84 MMHG | OXYGEN SATURATION: 92 % | RESPIRATION RATE: 16 BRPM | TEMPERATURE: 98 F | HEART RATE: 53 BPM

## 2023-02-10 PROCEDURE — 96376 TX/PRO/DX INJ SAME DRUG ADON: CPT

## 2023-02-10 PROCEDURE — 70553 MRI BRAIN STEM W/O & W/DYE: CPT | Mod: MG

## 2023-02-10 PROCEDURE — 83735 ASSAY OF MAGNESIUM: CPT

## 2023-02-10 PROCEDURE — U0003: CPT

## 2023-02-10 PROCEDURE — U0005: CPT

## 2023-02-10 PROCEDURE — 99238 HOSP IP/OBS DSCHRG MGMT 30/<: CPT

## 2023-02-10 PROCEDURE — G1004: CPT

## 2023-02-10 PROCEDURE — 85025 COMPLETE CBC W/AUTO DIFF WBC: CPT

## 2023-02-10 PROCEDURE — 70553 MRI BRAIN STEM W/O & W/DYE: CPT | Mod: 26,MG

## 2023-02-10 PROCEDURE — 96374 THER/PROPH/DIAG INJ IV PUSH: CPT | Mod: XU

## 2023-02-10 PROCEDURE — A9579: CPT

## 2023-02-10 PROCEDURE — 80048 BASIC METABOLIC PNL TOTAL CA: CPT

## 2023-02-10 PROCEDURE — 96375 TX/PRO/DX INJ NEW DRUG ADDON: CPT | Mod: XU

## 2023-02-10 PROCEDURE — G0378: CPT

## 2023-02-10 PROCEDURE — 36415 COLL VENOUS BLD VENIPUNCTURE: CPT

## 2023-02-10 PROCEDURE — 70496 CT ANGIOGRAPHY HEAD: CPT | Mod: MA

## 2023-02-10 PROCEDURE — 99285 EMERGENCY DEPT VISIT HI MDM: CPT | Mod: 25

## 2023-02-10 PROCEDURE — 93005 ELECTROCARDIOGRAM TRACING: CPT

## 2023-02-10 PROCEDURE — 99232 SBSQ HOSP IP/OBS MODERATE 35: CPT

## 2023-02-10 PROCEDURE — 70498 CT ANGIOGRAPHY NECK: CPT | Mod: MA

## 2023-02-10 RX ORDER — KETOROLAC TROMETHAMINE 30 MG/ML
30 SYRINGE (ML) INJECTION ONCE
Refills: 0 | Status: DISCONTINUED | OUTPATIENT
Start: 2023-02-10 | End: 2023-02-10

## 2023-02-10 RX ADMIN — SERTRALINE 50 MILLIGRAM(S): 25 TABLET, FILM COATED ORAL at 08:00

## 2023-02-10 RX ADMIN — Medication 25 MILLIGRAM(S): at 08:00

## 2023-02-10 RX ADMIN — Medication 30 MILLIGRAM(S): at 03:14

## 2023-02-10 RX ADMIN — CARBAMIDE PEROXIDE 1 DROP(S): 81.86 SOLUTION/ DROPS AURICULAR (OTIC) at 06:14

## 2023-02-10 NOTE — ED CDU PROVIDER SUBSEQUENT DAY NOTE - HISTORY
Pt with continued headache overnight, given toradol  MRI obtained further characterizing meningioma  Pending neurosurgery re-evaluation

## 2023-02-10 NOTE — ED CDU PROVIDER SUBSEQUENT DAY NOTE - ATTENDING APP SHARED VISIT CONTRIBUTION OF CARE
Seen on rounds.  Reports no symptoms at present.  Denies dizziness.  Endorses pressure to the back of head that started on Monday: Persistent and worsening since.  With associated blurred vision.  Went to PMD and was referred to the emergency department.  Denies headache.  Denies vertigo.  Feels unsteady.  Took Tylenol with no relief.  Physical examination: Nontoxic-appearing, no acute distress, normal coordination, bilateral external auditory canal wax impaction:  irrigated with warm water.  MRI completed.  Awaiting neurosurgery evaluation.

## 2023-02-10 NOTE — PROGRESS NOTE ADULT - SUBJECTIVE AND OBJECTIVE BOX
HPI: Patient is a 66 y/o F PMH vertigo, depression presenting to [facility] ED with HAs x 4 days. Pt. states she's been waking up with headaches for the past 4 days. She states the headaches are constant and feel like pressure in the back of her head. She also endorses dizziness & intermittent episodes of blurry vision. She is currently at her baseline. Denies AC/AP use, trauma/injury, weakness, numbness/tingling, N/V/D, recent illness, fever.     INTERVAL HPI/OVERNIGHT EVENTS:  67y Female seen this AM on neurosurgical rounds. Pt. reports improvement in HA but c/o dizziness. No overnight events reported.     Vital Signs Last 24 Hrs  T(C): 37.1 (10 Feb 2023 07:23), Max: 37.1 (10 Feb 2023 07:23)  T(F): 98.7 (10 Feb 2023 07:23), Max: 98.7 (10 Feb 2023 07:23)  HR: 70 (10 Feb 2023 07:23) (56 - 74)  BP: 116/75 (10 Feb 2023 07:23) (116/75 - 139/85)  BP(mean): --  RR: 18 (10 Feb 2023 07:23) (16 - 18)  SpO2: 95% (10 Feb 2023 07:23) (95% - 99%)    Parameters below as of 10 Feb 2023 07:23  Patient On (Oxygen Delivery Method): room air      PHYSICAL EXAM:  GENERAL: NAD, well-groomed  HEAD:  Atraumatic, normocephalic  EYES: Conjunctiva and sclera clear; corneal reflex intact  ENMT: Moist mucous membranes  NECK: Supple, no JVD, normal thyroid  RICH COMA SCORE: E-4 V-5 M-6 = 15  MENTAL STATUS: AAO x3; Awake; Opens eyes spontaneously; Appropriately conversant without aphasia; following simple commands  CRANIAL NERVES: PERRL. EOMI without nystagmus. Facial sensation intact V1-3 distribution b/l. Face symmetric w/ normal eye closure and smile, tongue midline.   MOTOR: strength 5/5 b/l upper and lower extremities  SENSATION: grossly intact to light touch all extremities  COORDINATION: No upper extremity dysmetria  EXTREMITIES:  2+ peripheral pulses, no clubbing, cyanosis, or edema  SKIN: Warm, dry; no rashes or lesions    LABS:                        11.5   5.83  )-----------( 290      ( 09 Feb 2023 15:10 )             36.9     02-09    141  |  103  |  11.1  ----------------------------<  93  4.0   |  30.0<H>  |  0.64    Ca    9.3      09 Feb 2023 15:10  Mg     2.1     02-09              RADIOLOGY & ADDITIONAL TESTS:  < from: MR Head w/wo IV Cont (02.10.23 @ 02:37) >  IMPRESSION:    Lesion at the floor of the left anterior cranial fossa with faint   associated contrast enhancement likely reflects densely calcified   meningioma, measuring up to 1.8 cm. No mass effect on the adjacent left   inferior frontal lobe, without abnormal signal or enhancement in the   brain parenchyma.    No evidence of acute intracranial hemorrhage or acute infarct.    --- End ofReport ---            MOHIT TEMPLE MD; Attending Radiologist  This document has been electronically signed. Feb 10 2023  3:12AM    < end of copied text >

## 2023-02-10 NOTE — ED CDU PROVIDER DISPOSITION NOTE - ATTENDING CONTRIBUTION TO CARE
placed on observation for further evaluation of brain lesion found on CT scan during workup of headache. Had MR and neurosurgery consult.  Has densely calcified meningioma on the floor of the L anterior cranial fossa.  Cleared for discharge and continued monitoring in outpatient setting.

## 2023-02-10 NOTE — ED CDU PROVIDER SUBSEQUENT DAY NOTE - PROGRESS NOTE DETAILS
pt with improvement of symptoms, cerumen impaction noted in ears - cerumen disimpaction will be preformed   neurosurgery saw patient - will follow up in office

## 2023-02-10 NOTE — ED ADULT NURSE REASSESSMENT NOTE - NS ED NURSE REASSESS COMMENT FT1
assumed pt care at 1930 - pt aware of pending MRI to be done and also aware of OBS status.
report given to Silvino LINDSEY of Obs.
Assumed care of the patient @0730. Pt A&Ox4, VSS afebrile. Pt c/o ongoing dizziness, Antivert given. Awaiting Neurosx consult. Patient in understanding of plan of care. Patient with no further questions for the RN. Resting in comfort. Call bell within reach and encouraged to use when assistance needed. Will continue to monitor.

## 2023-02-10 NOTE — ED CDU PROVIDER DISPOSITION NOTE - CLINICAL COURSE
pt is a 66 y/o female with a pmhx of vertigo presenting to the ed for evaluation of headache for the past four days, pt stating feels constant pressure in the back of her left head, ct head preformed - meningoma noted, placed in observation for MR head, neurosurgery consult - 1.8 cm meningoma noted, pt to follow up in office with neurosurgery

## 2023-02-10 NOTE — ED ADULT NURSE REASSESSMENT NOTE - NSIMPLEMENTINTERV_GEN_ALL_ED
Implemented All Fall with Harm Risk Interventions:  [place] to call system. Call bell, personal items and telephone within reach. Instruct patient to call for assistance. Room bathroom lighting operational. Non-slip footwear when patient is off stretcher. Physically safe environment: no spills, clutter or unnecessary equipment. Stretcher in lowest position, wheels locked, appropriate side rails in place. Provide visual cue, wrist band, yellow gown, etc. Monitor gait and stability. Monitor for mental status changes and reorient to person, place, and time. Review medications for side effects contributing to fall risk. Reinforce activity limits and safety measures with patient and family. Provide visual clues: red socks. Attending Attestation (For Attendings USE Only)...

## 2023-02-10 NOTE — ED CDU PROVIDER DISPOSITION NOTE - NSFOLLOWUPINSTRUCTIONS_ED_ALL_ED_FT
please follow up with neurosurgery outpatient      Meningioma       A meningioma is a growth (tumor) that occurs in the meninges, which is the thin tissue that covers the brain and spinal cord. Meningiomas are usually benign. Benign means they are not cancerous and do not spread to other areas. In rare cases, a meningioma may become cancerous (malignant).      What are the causes?    This condition may be caused by:  •Being exposed to ionizing radiation. This type of energy occurs naturally, and it has artificial sources, such as X-rays and some medical devices.      •Neurofibromatosis 2. This is a genetic disorder that causes multiple soft tumors.      •Genetic mutation. This is a change in certain genes.      In many cases, the cause of this condition is not known.      What increases the risk?    The following factors may make you more likely to develop this condition:  •Having been exposed to radiation, especially as a child.      •Being a woman. There may be a greater risk associated with having female hormones. Older women have a higher risk of meningiomas than men or children. However, men have a higher risk of malignant meningiomas.      •Obesity.        What are the signs or symptoms?    Common symptoms of this condition include:  •Headaches.      •Nausea and vomiting.      •Vision changes.      •Changes to hearing.      •Loss of your sense of smell.      Other symptoms include:  •Seizures.      •Weakness or numbness on one side of your body, or in an arm or a leg.      •Problems with memory or thinking.      •Mood or personality changes.      Symptoms of this condition usually begin very slowly. The symptoms may depend on the size and location of your tumor.      How is this diagnosed?    This condition is diagnosed based on:  •Results of brain imaging tests, such as a CT scan or an MRI.      •Removal of a tissue sample of the tumor to look at under a microscope (biopsy). This may be done to confirm the diagnosis and to help determine the best treatment for your condition.        How is this treated?    This condition may be treated with:  •Steroids. These are medicines for lowering brain swelling and improving symptoms.      •Radiation therapy. This therapy uses high-energy rays to shrink or kill your tumor.      •Surgery to remove as much of your tumor as possible.      You may not have treatment until your symptoms start to affect your daily activities. This is because meningiomas grow very slowly. Your health care provider may prefer to watch for growth of your tumor before starting treatment.      Follow these instructions at home:    •Take over-the-counter and prescription medicines only as told by your health care provider.      •Follow instructions from your health care provider about eating or drinking restrictions.      •Drink enough fluid to keep your urine pale yellow.      •Return to your normal activities as told by your health care provider. Ask your health care provider what activities are safe for you.      •Keep all follow-up visits as told by your health care provider. This is important. You may need regular visits to watch the growth of your tumor.        Contact a health care provider if:    •You have symptoms that come back.      •You have diarrhea.      •You vomit.      •You have pain in your abdomen (abdominal pain).      •You cannot eat or drink as much as you need.      •You are weaker or more tired than usual.      •You are losing weight without trying.        Get help right away if:    •Your diarrhea, vomiting, or abdominal pain does not go away, or you cannot eat or drink without vomiting.      •You have sudden changes in vision.      •You have trouble walking.      •You have a seizure.      •You have bleeding that does not stop.      •You have trouble breathing.      •You have a fever.      •You have new weakness or numbness on one side of your body.        Summary    •A meningioma is a growth (tumor) that occurs in the meninges, which is the thin tissue that covers the brain and spinal cord.      •Meningiomas are usually benign. Benign means they are not cancerous and do not spread to other areas.      •Symptoms of this condition usually begin very slowly. The symptoms may depend on the size and location of your tumor.      •You may not need treatment until your symptoms start to affect your daily activities. Your tumor may be watched over time.

## 2023-02-10 NOTE — ED CDU PROVIDER SUBSEQUENT DAY NOTE - NS ED MD PROGRESS NOTE ADD
U cx preliminary results-> 100 K E. coli. Sensitivities pending. Urgent care doctor's note yet not yet available. I see patient was prescribed Bactrim DS 1 twice daily #6 on 11/10/2022. Add Progress Note...

## 2023-02-10 NOTE — PROGRESS NOTE ADULT - ASSESSMENT
68 y/o F PMH vertigo, depression presenting to [facility] ED with HAs x 4 days with associated dizziness & intermittent episodes of blurry vision.   -MR brain +/- reveals densely calcified meningioma on the floor of the L anterior cranial fossa.    Plan:  -D/w Dr. Alejandro   -Imaging reviewed  -No acute neurosurgical intervention indicated  -Pain control PRN  -Follow up with Dr. Alejandro in his office in 3 weeks  -Neurosurgery signing off; please recall PRN with any further questions/concerns  -Dispo per ED

## 2023-02-10 NOTE — ED CDU PROVIDER DISPOSITION NOTE - CARE PROVIDER_API CALL
Mio Alejandro; PhD)  Neurosurgery  [address] [place] [address]  Phone: [phone]  Fax: [phone]  Follow Up Time:

## 2023-02-10 NOTE — ED CDU PROVIDER DISPOSITION NOTE - PATIENT PORTAL LINK FT
You can access the FollowMyHealth Patient Portal offered by [facility] by registering at the following website: [email or web address]. By joining [facility]’s FollowMyHealth portal, you will also be able to view your health information using other applications (apps) compatible with our system.

## 2023-03-07 ENCOUNTER — APPOINTMENT (OUTPATIENT)
Dept: NEUROSURGERY | Facility: CLINIC | Age: 68
End: 2023-03-07
Payer: MEDICARE

## 2023-03-07 VITALS
BODY MASS INDEX: 31.39 KG/M2 | TEMPERATURE: 98 F | HEIGHT: 67 IN | WEIGHT: 200 LBS | OXYGEN SATURATION: 97 % | SYSTOLIC BLOOD PRESSURE: 143 MMHG | HEART RATE: 82 BPM | DIASTOLIC BLOOD PRESSURE: 84 MMHG

## 2023-03-07 DIAGNOSIS — R51.9 HEADACHE, UNSPECIFIED: ICD-10-CM

## 2023-03-07 PROCEDURE — 99214 OFFICE O/P EST MOD 30 MIN: CPT

## 2023-03-07 RX ORDER — BENZONATATE 200 MG/1
200 CAPSULE ORAL
Qty: 21 | Refills: 0 | Status: ACTIVE | COMMUNITY
Start: 2022-12-26

## 2023-03-07 RX ORDER — MELOXICAM 15 MG/1
15 TABLET ORAL
Qty: 30 | Refills: 0 | Status: ACTIVE | COMMUNITY
Start: 2022-12-26

## 2023-03-07 RX ORDER — METHYLPREDNISOLONE 4 MG/1
4 TABLET ORAL
Qty: 21 | Refills: 0 | Status: ACTIVE | COMMUNITY
Start: 2022-10-21

## 2023-03-07 NOTE — ASSESSMENT
[FreeTextEntry1] : Patient with above history and imaging. 1.8 cm calcified meningioma with no neurological deficits. She will need a f/u MRI head w/wo in 3 months to assess for growth and baseline. She is to f/u with her PCP for continued management of Fioricet, medication sent to pharmacy until she can follow with PCP. Advised any further refills have to come from the PCP. \par \par \par Plan:\par MRI head w/wo\par f/u after imaging with Dr. Alejandro\par Patient knows to call the office if there are any new or worsening symptoms. \par All questions and concerns answered and addressed in detail to patient's complete satisfaction. Patient verbalized understanding and agreed to plan.\par \par  \par

## 2023-03-07 NOTE — PHYSICAL EXAM
[General Appearance - Alert] : alert [General Appearance - In No Acute Distress] : in no acute distress [General Appearance - Well Nourished] : well nourished [Oriented To Time, Place, And Person] : oriented to person, place, and time [Impaired Insight] : insight and judgment were intact [Affect] : the affect was normal [Person] : oriented to person [Place] : oriented to place [Time] : oriented to time [Motor Strength] : muscle strength was normal in all four extremities [Motor Handedness Right-Handed] : the patient is right hand dominant [Sensation Tactile Decrease] : light touch was intact [Sensation Pain / Temperature Decrease] : pain and temperature was intact [Balance] : balance was intact [Sclera] : the sclera and conjunctiva were normal [PERRL With Normal Accommodation] : pupils were equal in size, round, reactive to light, with normal accommodation [Extraocular Movements] : extraocular movements were intact [Neck Appearance] : the appearance of the neck was normal [] : no respiratory distress [Abnormal Walk] : normal gait [Involuntary Movements] : no involuntary movements were seen [Motor Tone] : muscle strength and tone were normal [Coordination - Dysmetria Impaired Finger-to-Nose Bilateral] : not present

## 2023-03-07 NOTE — HISTORY OF PRESENT ILLNESS
[< 3 months] : less than 3 months [FreeTextEntry1] : Meningioma on MRI [de-identified] : MARCIN YI is a 67 year old female with PMH vertigo, depression, who presents for initial/post op/follow up neurosurgical evaluation of meningioma found after hospital visit. She presented to [facility] ED on 2/9/23 with HAs x 4 days. Pt. states was waking up with headaches for 4 days which prompted the ED visit. She stated that the headaches were constant and felt like pressure in the back of her head. She also endorsed dizziness & intermittent episodes of blurry vision. She continues with headaches intermittently, she was given Fioricet by her PCP which alleviates the headaches. She would like for me to refill her medication. She states she is feeling a little better and now she denies current headache, loss of vision, changes in personality or cognition, difficulty speaking or finding the correct words, unilateral weakness or impaired sensation, problems with coordination, difficulty walking or falls. She has never had MRI imaging of her head prior. \par \par  \par

## 2023-03-07 NOTE — DATA REVIEWED
[de-identified] : EXAM: MR BRAIN WAW IC ORDERED BY: MOHIT HERNANDEZ\par \par PROCEDURE DATE: 02/10/2023\par \par \par \par INTERPRETATION: HISTORY: Brain mass or lesion.\par \par TECHNIQUE: Pre and postcontrast MRI of the brain.\par CONTRAST: 9 cc administered mL of Gadavist, 1 cc discarded.\par \par COMPARISON: No prior MRI. Head CT 2/9/2023.\par \par FINDINGS:\par \par No evidence of acute infarct or acute intracranial hemorrhage. No hydrocephalus. No extra-axial collections. The skull base flow voids are present. No abnormal parenchymal contrast enhancement.\par \par Low T1 and T2 signal at the floor of the anterior cranial fossa on the left with associated susceptibility artifact corresponds with mineralization seen on CT, measures approximately 1.8 x 1.5 x 0.6 cm (2:10, 11:14). There is associated faint contrast enhancement (12:80). This likely reflects a densely calcified meningioma. There is mild mass effect on the adjacent left inferior frontal lobe, without abnormal signal or enhancement within the brain parenchyma.\par \par Orbital contents are unremarkable. The paranasal sinuses and mastoid air cells are clear.\par \par IMPRESSION:\par \par Lesion at the floor of the left anterior cranial fossa with faint associated contrast enhancement likely reflects densely calcified meningioma, measuring up to 1.8 cm. No mass effect on the adjacent left inferior frontal lobe, without abnormal signal or enhancement in the brain parenchyma.\par \par No evidence of acute intracranial hemorrhage or acute infarct.\par

## 2023-04-10 NOTE — ED ADULT NURSE NOTE - NS ED NURSE DC PT EDUCATION RESOURCES
Cimzia Counseling:  I discussed with the patient the risks of Cimzia including but not limited to immunosuppression, allergic reactions and infections.  The patient understands that monitoring is required including a PPD at baseline and must alert us or the primary physician if symptoms of infection or other concerning signs are noted. None needed

## 2023-04-18 ENCOUNTER — OUTPATIENT (OUTPATIENT)
Dept: OUTPATIENT SERVICES | Facility: HOSPITAL | Age: 68
LOS: 1 days | End: 2023-04-18
Payer: MEDICARE

## 2023-04-18 ENCOUNTER — APPOINTMENT (OUTPATIENT)
Dept: MRI IMAGING | Facility: CLINIC | Age: 68
End: 2023-04-18

## 2023-04-18 DIAGNOSIS — D32.9 BENIGN NEOPLASM OF MENINGES, UNSPECIFIED: ICD-10-CM

## 2023-04-18 PROCEDURE — 70553 MRI BRAIN STEM W/O & W/DYE: CPT | Mod: 26

## 2023-05-01 ENCOUNTER — APPOINTMENT (OUTPATIENT)
Dept: NEUROSURGERY | Facility: CLINIC | Age: 68
End: 2023-05-01
Payer: MEDICARE

## 2023-05-01 VITALS
SYSTOLIC BLOOD PRESSURE: 126 MMHG | BODY MASS INDEX: 31.39 KG/M2 | HEIGHT: 67 IN | WEIGHT: 200 LBS | TEMPERATURE: 97.6 F | HEART RATE: 82 BPM | OXYGEN SATURATION: 98 % | DIASTOLIC BLOOD PRESSURE: 83 MMHG

## 2023-05-01 PROCEDURE — 99214 OFFICE O/P EST MOD 30 MIN: CPT

## 2023-05-01 RX ORDER — LISINOPRIL 10 MG/1
10 TABLET ORAL
Qty: 90 | Refills: 0 | Status: ACTIVE | COMMUNITY
Start: 2023-03-10

## 2023-05-02 ENCOUNTER — APPOINTMENT (OUTPATIENT)
Dept: NEUROSURGERY | Facility: CLINIC | Age: 68
End: 2023-05-02

## 2023-05-08 ENCOUNTER — APPOINTMENT (OUTPATIENT)
Dept: OBGYN | Facility: CLINIC | Age: 68
End: 2023-05-08
Payer: MEDICARE

## 2023-05-08 VITALS
DIASTOLIC BLOOD PRESSURE: 80 MMHG | BODY MASS INDEX: 30.13 KG/M2 | WEIGHT: 192 LBS | HEIGHT: 67 IN | SYSTOLIC BLOOD PRESSURE: 130 MMHG

## 2023-05-08 DIAGNOSIS — R73.03 PREDIABETES.: ICD-10-CM

## 2023-05-08 DIAGNOSIS — D49.6 NEOPLASM OF UNSPECIFIED BEHAVIOR OF BRAIN: ICD-10-CM

## 2023-05-08 DIAGNOSIS — Z01.411 ENCOUNTER FOR GYNECOLOGICAL EXAMINATION (GENERAL) (ROUTINE) WITH ABNORMAL FINDINGS: ICD-10-CM

## 2023-05-08 DIAGNOSIS — Z82.49 FAMILY HISTORY OF ISCHEMIC HEART DISEASE AND OTHER DISEASES OF THE CIRCULATORY SYSTEM: ICD-10-CM

## 2023-05-08 DIAGNOSIS — Z80.3 FAMILY HISTORY OF MALIGNANT NEOPLASM OF BREAST: ICD-10-CM

## 2023-05-08 PROCEDURE — 99387 INIT PM E/M NEW PAT 65+ YRS: CPT

## 2023-05-08 PROCEDURE — 99397 PER PM REEVAL EST PAT 65+ YR: CPT

## 2023-05-08 RX ORDER — SULFAMETHOXAZOLE AND TRIMETHOPRIM 800; 160 MG/1; MG/1
800-160 TABLET ORAL
Qty: 20 | Refills: 0 | Status: DISCONTINUED | COMMUNITY
Start: 2022-10-08 | End: 2023-05-08

## 2023-05-08 NOTE — PLAN
[FreeTextEntry1] : Encounter for GYN exam \par \par - PAP obtained \par - Mammogram/Bone density for 2024 \par \par F/U in 1 year or PRN \par All questions and concerns addressed during encounter. Pt. agreed to plan of care.

## 2023-05-08 NOTE — HISTORY OF PRESENT ILLNESS
[N] : Patient denies prior pregnancies [Menarche Age: ____] : age at menarche was [unfilled] [Menopause Age: ____] : age at menopause was [unfilled] [Patient reported mammogram was normal] : Patient reported mammogram was normal [Patient reported breast sonogram was normal] : Patient reported breast sonogram was normal [Patient reported colonoscopy was normal] : Patient reported colonoscopy was normal [FreeTextEntry1] : 67 year old female presents for annual examination. Pt. is  and has no complaints. \par \par Pt. recently had mammogram performed and needs bone density.  [Mammogramdate] : 2023 [BreastSonogramDate] : 2023 [ColonoscopyDate] : 2021 [PGHxTotal] : 0 [PGHxFullTerm] : 0 [PGHxPremature] : 0 [PGHxAbortions] : 0 [[facility]] : 0 [PGHxABSpont] : 0 [PGHxABInduced] : 0 [PGHxEctopic] : 0 [PGHxMultBirths] : 0

## 2023-05-11 LAB — HPV HIGH+LOW RISK DNA PNL CVX: NOT DETECTED

## 2023-05-15 LAB — CYTOLOGY CVX/VAG DOC THIN PREP: ABNORMAL

## 2023-11-08 ENCOUNTER — APPOINTMENT (OUTPATIENT)
Dept: ORTHOPEDIC SURGERY | Facility: CLINIC | Age: 68
End: 2023-11-08
Payer: MEDICARE

## 2023-11-08 VITALS
TEMPERATURE: 98.1 F | DIASTOLIC BLOOD PRESSURE: 78 MMHG | BODY MASS INDEX: 29.82 KG/M2 | HEIGHT: 67 IN | SYSTOLIC BLOOD PRESSURE: 125 MMHG | WEIGHT: 190 LBS | HEART RATE: 73 BPM

## 2023-11-08 PROCEDURE — 20550 NJX 1 TENDON SHEATH/LIGAMENT: CPT | Mod: 59,LT

## 2023-11-08 PROCEDURE — 99205 OFFICE O/P NEW HI 60 MIN: CPT | Mod: 25

## 2023-11-08 PROCEDURE — 73030 X-RAY EXAM OF SHOULDER: CPT | Mod: RT

## 2023-11-08 PROCEDURE — 99203 OFFICE O/P NEW LOW 30 MIN: CPT | Mod: 25

## 2023-11-08 PROCEDURE — 20610 DRAIN/INJ JOINT/BURSA W/O US: CPT | Mod: 59,RT

## 2023-11-08 PROCEDURE — 20605 DRAIN/INJ JOINT/BURSA W/O US: CPT | Mod: LT

## 2023-11-08 PROCEDURE — 73130 X-RAY EXAM OF HAND: CPT | Mod: 50

## 2023-11-09 ENCOUNTER — OUTPATIENT (OUTPATIENT)
Dept: OUTPATIENT SERVICES | Facility: HOSPITAL | Age: 68
LOS: 1 days | End: 2023-11-09

## 2023-11-09 ENCOUNTER — APPOINTMENT (OUTPATIENT)
Dept: MRI IMAGING | Facility: CLINIC | Age: 68
End: 2023-11-09
Payer: MEDICARE

## 2023-11-09 DIAGNOSIS — Z00.8 ENCOUNTER FOR OTHER GENERAL EXAMINATION: ICD-10-CM

## 2023-11-09 PROCEDURE — 70553 MRI BRAIN STEM W/O & W/DYE: CPT | Mod: 26

## 2023-11-10 RX ORDER — MELOXICAM 7.5 MG/1
7.5 TABLET ORAL TWICE DAILY
Qty: 60 | Refills: 0 | Status: ACTIVE | COMMUNITY
Start: 2023-11-10 | End: 1900-01-01

## 2023-11-15 ENCOUNTER — APPOINTMENT (OUTPATIENT)
Dept: NEUROSURGERY | Facility: CLINIC | Age: 68
End: 2023-11-15
Payer: MEDICARE

## 2023-11-15 PROCEDURE — 99214 OFFICE O/P EST MOD 30 MIN: CPT

## 2023-11-26 ENCOUNTER — EMERGENCY (EMERGENCY)
Facility: HOSPITAL | Age: 68
LOS: 1 days | Discharge: HOME | End: 2023-11-26
Attending: STUDENT IN AN ORGANIZED HEALTH CARE EDUCATION/TRAINING PROGRAM
Payer: MEDICARE

## 2023-11-26 VITALS
HEART RATE: 74 BPM | SYSTOLIC BLOOD PRESSURE: 138 MMHG | WEIGHT: 199.96 LBS | OXYGEN SATURATION: 97 % | TEMPERATURE: 98 F | RESPIRATION RATE: 19 BRPM | DIASTOLIC BLOOD PRESSURE: 83 MMHG

## 2023-11-26 VITALS
HEART RATE: 73 BPM | SYSTOLIC BLOOD PRESSURE: 147 MMHG | RESPIRATION RATE: 18 BRPM | TEMPERATURE: 98 F | OXYGEN SATURATION: 95 % | DIASTOLIC BLOOD PRESSURE: 81 MMHG

## 2023-11-26 PROCEDURE — 99283 EMERGENCY DEPT VISIT LOW MDM: CPT | Mod: 25

## 2023-11-26 PROCEDURE — 71046 X-RAY EXAM CHEST 2 VIEWS: CPT

## 2023-11-26 PROCEDURE — 99284 EMERGENCY DEPT VISIT MOD MDM: CPT

## 2023-11-26 PROCEDURE — 71046 X-RAY EXAM CHEST 2 VIEWS: CPT | Mod: 26

## 2023-11-26 NOTE — ED PROVIDER NOTE - ATTENDING APP SHARED VISIT CONTRIBUTION OF CARE
Pt with several years of coughing. worse over months. no different today.  Pt reassured. instructed to f/up with pcp and given return instructions.

## 2023-11-26 NOTE — ED ADULT TRIAGE NOTE - CHIEF COMPLAINT QUOTE
Pt BIBA c/o persistent cough x 1 month.  Pt went to see MD boyce and diagnosed with asthma as a result of c/o cough and placed on albuterol HFA and xyzal without relief.  PMH HTN.  Pt denies chest pain or sob.

## 2023-11-26 NOTE — ED PROVIDER NOTE - CARE PROVIDER_API CALL
Osbaldo Carr  Pulmonary Disease  [address], [address]  [place] [address]  Phone: [phone]  Fax: [phone]  Follow Up Time:

## 2023-11-26 NOTE — ED ADULT NURSE NOTE - CHIEF COMPLAINT QUOTE
Pt BIBA c/o persistent cough x 1 month.  Pt went to see MD boyce and diagnosed with asthma as a result of c/o cough and placed on albuterol HFA and xyzal without relief.  PMH HTN.  Pt denies chest pain or sob.
Pending functional progress/Sub-acute Rehab/Home PT

## 2023-11-26 NOTE — ED PROVIDER NOTE - PHYSICAL EXAMINATION
General: In NAD, non-toxic/well-appearing.  Skin: No rashes or lesions. Warm, dry, color normal for race.   Head: Normocephalic/atraumatic.   Eyes: Sclera anicteric, conjunctivae clear b/l. PERRLA, EOMI.   Neck: Supple, FROM.   Cardio: Rate and rhythm regular. No audible murmur.  PV: No edema of feet/ankles.  Resp: Breath sounds vesicular, symmetrical and without rales, rhonchi or wheezing b/l.  MSK: MAEx4. FROM.   Neuro: A&Ox3. Appears nonfocal.

## 2023-11-26 NOTE — ED PROVIDER NOTE - IV ALTEPLASE EXCL ABS HIDDEN
[Initial Consultation] : an initial consultation for [Blood Count Assessment] : blood count assessment show

## 2023-11-26 NOTE — ED PROVIDER NOTE - NSFOLLOWUPINSTRUCTIONS_ED_ALL_ED_FT
Cough, Adult      Coughing is a reflex that clears your throat and your airways (respiratory system). Coughing helps to heal and protect your lungs. It is normal to cough occasionally, but a cough that happens with other symptoms or lasts a long time may be a sign of a condition that needs treatment. An acute cough may only last 2–3 weeks, while a chronic cough may last 8 or more weeks.    Coughing is commonly caused by:  •Infection of the respiratory systemby viruses or bacteria.      •Breathing in substances that irritate your lungs.      •Allergies.      •Asthma.      •Mucus that runs down the back of your throat (postnasal drip).      •Smoking.      •Acid backing up from the stomach into the esophagus (gastroesophageal reflux).      •Certain medicines.      •Chronic lung problems.      •Other medical conditions such as heart failure or a blood clot in the lung (pulmonary embolism).        Follow these instructions at home:    Medicines     •Take over-the-counter and prescription medicines only as told by your health care provider.      •Talk with your health care provider before you take a cough suppressant medicine.        Lifestyle      •Avoid cigarette smoke. Do not use any products that contain nicotine or tobacco, such as cigarettes, e-cigarettes, and chewing tobacco. If you need help quitting, ask your health care provider.      •Drink enough fluid to keep your urine pale yellow.      •Avoid caffeine.      • Do not drink alcohol if your health care provider tells you not to drink.        General instructions      •Pay close attention to changes in your cough. Tell your health care provider about them.      •Always cover your mouth when you cough.      •Avoid things that make you cough, such as perfume, candles, cleaning products, or campfire or tobacco smoke.      •If the air is dry, use a cool mist vaporizer or humidifier in your bedroom or your home to help loosen secretions.      •If your cough is worse at night, try to sleep in a semi-upright position.      •Rest as needed.      •Keep all follow-up visits as told by your health care provider. This is important.        Contact a health care provider if you:    •Have new symptoms.      •Cough up pus.      •Have a cough that does not get better after 2–3 weeks or gets worse.      •Cannot control your cough with cough suppressant medicines and you are losing sleep.      •Have pain that gets worse or pain that is not helped with medicine.      •Have a fever.      •Have unexplained weight loss.      •Have night sweats.        Get help right away if:    •You cough up blood.      •You have difficulty breathing.      •Your heartbeat is very fast.      These symptoms may represent a serious problem that is an emergency. Do not wait to see if the symptoms will go away. Get medical help right away. Call your local emergency services (911 in the [place] Do not drive yourself to the hospital.       Summary    •Coughing is a reflex that clears your throat and your airways. It is normal to cough occasionally, but a cough that happens with other symptoms or lasts a long time may be a sign of a condition that needs treatment.      •Take over-the-counter and prescription medicines only as told by your health care provider.      •Always cover your mouth when you cough.      •Contact a health care provider if you have new symptoms or a cough that does not get better after 2–3 weeks or gets worse.      This information is not intended to replace advice given to you by your health care provider. Make sure you discuss any questions you have with your health care provider. - Please call tomorrow to schedule follow up appointment with pulmonologist.   - Please bring all documentation from your ED visit to any related future follow up appointment.  - Please call to schedule follow up appointment with your primary care physician within 24-48 hours.  - Please seek immediate medical attention or return to the ED for any new/worsening, signs/symptoms, or concerns.    Feel better!       Cough, Adult      Coughing is a reflex that clears your throat and your airways (respiratory system). Coughing helps to heal and protect your lungs. It is normal to cough occasionally, but a cough that happens with other symptoms or lasts a long time may be a sign of a condition that needs treatment. An acute cough may only last 2–3 weeks, while a chronic cough may last 8 or more weeks.    Coughing is commonly caused by:  •Infection of the respiratory systemby viruses or bacteria.      •Breathing in substances that irritate your lungs.      •Allergies.      •Asthma.      •Mucus that runs down the back of your throat (postnasal drip).      •Smoking.      •Acid backing up from the stomach into the esophagus (gastroesophageal reflux).      •Certain medicines.      •Chronic lung problems.      •Other medical conditions such as heart failure or a blood clot in the lung (pulmonary embolism).        Follow these instructions at home:    Medicines     •Take over-the-counter and prescription medicines only as told by your health care provider.      •Talk with your health care provider before you take a cough suppressant medicine.        Lifestyle      •Avoid cigarette smoke. Do not use any products that contain nicotine or tobacco, such as cigarettes, e-cigarettes, and chewing tobacco. If you need help quitting, ask your health care provider.      •Drink enough fluid to keep your urine pale yellow.      •Avoid caffeine.      • Do not drink alcohol if your health care provider tells you not to drink.        General instructions      •Pay close attention to changes in your cough. Tell your health care provider about them.      •Always cover your mouth when you cough.      •Avoid things that make you cough, such as perfume, candles, cleaning products, or campfire or tobacco smoke.      •If the air is dry, use a cool mist vaporizer or humidifier in your bedroom or your home to help loosen secretions.      •If your cough is worse at night, try to sleep in a semi-upright position.      •Rest as needed.      •Keep all follow-up visits as told by your health care provider. This is important.        Contact a health care provider if you:    •Have new symptoms.      •Cough up pus.      •Have a cough that does not get better after 2–3 weeks or gets worse.      •Cannot control your cough with cough suppressant medicines and you are losing sleep.      •Have pain that gets worse or pain that is not helped with medicine.      •Have a fever.      •Have unexplained weight loss.      •Have night sweats.        Get help right away if:    •You cough up blood.      •You have difficulty breathing.      •Your heartbeat is very fast.      These symptoms may represent a serious problem that is an emergency. Do not wait to see if the symptoms will go away. Get medical help right away. Call your local emergency services (911 in the [place] Do not drive yourself to the hospital.       Summary    •Coughing is a reflex that clears your throat and your airways. It is normal to cough occasionally, but a cough that happens with other symptoms or lasts a long time may be a sign of a condition that needs treatment.      •Take over-the-counter and prescription medicines only as told by your health care provider.      •Always cover your mouth when you cough.      •Contact a health care provider if you have new symptoms or a cough that does not get better after 2–3 weeks or gets worse.      This information is not intended to replace advice given to you by your health care provider. Make sure you discuss any questions you have with your health care provider.

## 2023-11-26 NOTE — ED PROVIDER NOTE - CLINICAL SUMMARY MEDICAL DECISION MAKING FREE TEXT BOX
66 yo female PMHx vertigo, mood disorder, meningioma presents to ED c/o cough x years worsening over last several months. Has followed with PMD allergist and PMD. Completed multiple rounds of antibiotics, steroids, inhalers and been diagnosed with asthma. Has not followed up with pulmonologist. 68 yo female PMHx vertigo, mood disorder, meningioma presents to ED c/o cough x years worsening over last several months. Has followed with PMD allergist and PMD. Completed multiple rounds of antibiotics, steroids, inhalers and been diagnosed with asthma. Has not followed up with pulmonologist. CXR negative. Medically stable for discharge.

## 2023-11-26 NOTE — ED PROVIDER NOTE - OBJECTIVE STATEMENT
66 yo female PMHx vertigo, mood disorder, asthma, meningioma presents to ED c/o cough. Patient reports cough has been for years, but worse over the last several months. Has followed with PMD and allergist and has had multiple rounds of steroids, antibiotics, inhalers with diagnosis made of asthma. Has had CXR. Did not self medicate PTA. No further complaints at this time.   Denies h/o cardiac disease. 68 yo female PMHx vertigo, mood disorder, asthma, meningioma presents to ED c/o cough. Patient reports cough has been for years, but worse over the last several months. Has followed with PMD and allergist and has had multiple rounds of steroids, antibiotics, inhalers with diagnosis made of asthma. Has had CXR. Did not self medicate PTA. No further complaints at this time.   Denies h/o cardiac disease, SOB.

## 2023-11-26 NOTE — ED PROVIDER NOTE - PATIENT PORTAL LINK FT
You can access the FollowMyHealth Patient Portal offered by [facility] by registering at the following website: [email or web address]. By joining [employer]’s FollowMyHealth portal, you will also be able to view your health information using other applications (apps) compatible with our system.

## 2023-11-26 NOTE — ED ADULT NURSE NOTE - NSFALLHARMRISKINTERV_ED_ALL_ED

## 2023-12-12 NOTE — ED ADULT TRIAGE NOTE - TEMPERATURE IN FAHRENHEIT (DEGREES F)
Called pt to reschedule appt due to provider being out sick. Pt declined first available with another provider. Pt stated she will go somewhere else.   
98.6

## 2023-12-14 ENCOUNTER — APPOINTMENT (OUTPATIENT)
Dept: PULMONOLOGY | Facility: CLINIC | Age: 68
End: 2023-12-14
Payer: MEDICARE

## 2023-12-14 VITALS
SYSTOLIC BLOOD PRESSURE: 118 MMHG | OXYGEN SATURATION: 99 % | WEIGHT: 190 LBS | HEIGHT: 65.75 IN | RESPIRATION RATE: 16 BRPM | DIASTOLIC BLOOD PRESSURE: 77 MMHG | HEART RATE: 73 BPM | BODY MASS INDEX: 30.9 KG/M2

## 2023-12-14 DIAGNOSIS — G47.33 OBSTRUCTIVE SLEEP APNEA (ADULT) (PEDIATRIC): ICD-10-CM

## 2023-12-14 DIAGNOSIS — Z87.891 PERSONAL HISTORY OF NICOTINE DEPENDENCE: ICD-10-CM

## 2023-12-14 DIAGNOSIS — Z86.79 PERSONAL HISTORY OF OTHER DISEASES OF THE CIRCULATORY SYSTEM: ICD-10-CM

## 2023-12-14 DIAGNOSIS — D32.9 BENIGN NEOPLASM OF MENINGES, UNSPECIFIED: ICD-10-CM

## 2023-12-14 DIAGNOSIS — R05.3 CHRONIC COUGH: ICD-10-CM

## 2023-12-14 DIAGNOSIS — R09.82 POSTNASAL DRIP: ICD-10-CM

## 2023-12-14 PROCEDURE — 99204 OFFICE O/P NEW MOD 45 MIN: CPT

## 2023-12-14 RX ORDER — MOMETASONE 50 UG/1
50 SPRAY, METERED NASAL DAILY
Qty: 3 | Refills: 3 | Status: ACTIVE | COMMUNITY
Start: 2023-12-14 | End: 1900-01-01

## 2023-12-14 RX ORDER — AZELASTINE HYDROCHLORIDE 205.5 UG/1
0.15 SPRAY, METERED NASAL
Qty: 1 | Refills: 1 | Status: ACTIVE | COMMUNITY
Start: 2023-12-14 | End: 1900-01-01

## 2023-12-14 NOTE — PLAN
[TextEntry] : For now, start nasonex and astelin. OTC nasal saline. ENT eval; info given. [place] to be done. She is to d/w PCP about switching lisinopril to another agent and assess effect on cough; must be off for a month to know for sure. Sleep study. Reviewed with the patient the short and long term health consequences of untreated YENIFER. Risk include, but not limited to, HTN, heart disease, stroke, MVAs.

## 2023-12-14 NOTE — ASSESSMENT
[FreeTextEntry1] : UACS with postnasal drip likely; the cough did respond to nasal decongestant spray. Possible involvement of ACEI. Lungs clear, cxr clear. Will still evaluate lung fxn. GERD may be contributing. Possible YENIFER.

## 2023-12-14 NOTE — PROCEDURE
[FreeTextEntry1] : ACC: [id number] EXAM: XR CHEST PA LAT 2V ORDERED BY: GERSON BURT  PROCEDURE DATE: 11/26/2023    INTERPRETATION: TECHNIQUE: 2 views of the chest.  COMPARISON: 1/31/2020  CLINICAL HISTORY: chronic cough  FINDINGS:  Frontal and lateral views of the chest demonstrates the lungs to be clear. The cardiomediastinal silhouette is normal. No acute osseous abnormalities. Moderate degenerative changes of the right shoulder.  IMPRESSION:  No acute cardiopulmonary disease process.  --- End of Report ---      MOY PINEDA MD; Attending Radiologist This document has been electronically signed. Nov 27 2023 10:26AM

## 2023-12-14 NOTE — REVIEW OF SYSTEMS
[Obesity] : obesity [Negative] : Dermatologic [TextBox_3] : per HPI [TextBox_14] : per HPI [TextBox_30] : per HPI [TextBox_44] : per HPI [TextBox_57] : per HPI [TextBox_69] : per HPI

## 2023-12-14 NOTE — HISTORY OF PRESENT ILLNESS
[Former] : former [TextBox_4] : Presents with cough for the past year. Worsened over the past 1-2 months.   Worse at night when she lays down; needs to sit up. Coughing is causing pain in rib muscles. Itchiness in throat. She has a postnasal drip. No seasonal variation.  No sob but she does have wheeze.   Occ GERD.  Takes lisinopril for HTN. Not sure when she started it. But cough has been worse recently.  Tried nyquil, robitussin, zyrtec liquid. Zyrtec liquid helps with itchineess in throat. Flonase did not help. Afrin helped with cough.   Cough got so bad,  called EMS on 11/26/23; went to [facility]; CXR clear.   Snores. Wakes up choking/gasping.   No pets.  Quit in 1996.  [YearQuit] : 1996

## 2024-01-23 ENCOUNTER — APPOINTMENT (OUTPATIENT)
Dept: ORTHOPEDIC SURGERY | Facility: CLINIC | Age: 69
End: 2024-01-23
Payer: MEDICARE

## 2024-01-23 PROCEDURE — 99214 OFFICE O/P EST MOD 30 MIN: CPT | Mod: 25

## 2024-01-23 PROCEDURE — 20610 DRAIN/INJ JOINT/BURSA W/O US: CPT | Mod: 59,RT

## 2024-01-23 NOTE — REASON FOR VISIT
[Follow-Up Visit] : a follow-up visit for [Hand Pain] : hand pain [Wrist Pain] : wrist pain [Other: ____] : [unfilled]

## 2024-01-23 NOTE — HISTORY OF PRESENT ILLNESS
[FreeTextEntry1] : Keren is a very pleasant 67-year-old female who presents today with multiple complaints ongoing for more than a year now.  This includes severe right shoulder pain discomfort and inability to sleep at night.  She also complains of left-sided severe wrist discomfort and swelling and difficulty with motion. Injections have been very beneficial however symptoms have recurred.

## 2024-01-23 NOTE — PHYSICAL EXAM
[de-identified] : Examination of the right shoulder reveals tenderness generalized.  No signs of septic shoulder.  And range of motion right versus left abduction 160 versus 160 external rotation 20 versus 30 internal rotation lower back pocket bilateral.  There is pain and weakness with resisted AB duction  Examination of the [left] wrist reveals tenderness at the level of the first dorsal compartment with a positive finkelstein's examination There is a left wrist joint effusion tenderness at the radiocarpal joint and midcarpal joint  [de-identified] : [4] views of [right] shoulder were [id number] today in my office and were seen by me and discussed with the patient.  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  These [show findings consistent with bilateral basal joint OA and findings of IP joint OA] There is a left wrist radiocarpal and midcarpal joint space narrowing with severe arthropathy.  We have discussed the scaphoid related osseous growth

## 2024-01-23 NOTE — ASSESSMENT
[FreeTextEntry1] : ASSESSMENT: The patient comes in today with multiple severe chronically exacerbated symptoms which have caused tremendous discomfort and inability to perform ADLs including right shoulder arthropathy and left wrist arthropathy as well as tendinitis of the wrist.  At this point she elects for injections I do recommend shoulder PT  For her left wrist arthritis at this point she would like to proceed with surgical management.  We have discussed partial carpectomy of the scaphoid with a large osseous growth as well as PIN AIN neurectomy and styloidectomy   The patient was adequately and thoroughly informed of my assessment of their current condition(s).  - This may diminish bodily function for the extremity. We discussed prognosis, treatment modalities including operative and nonoperative options for the above diagnostic assessment. For this, when accessible, I was able to review other physicians note(s) including reviewing other tests, imaging results as well as personally view these results for my own interpretation.   SHOULDER JOINT INJECTION [right]  Risk, benefits and alternatives were discussed with the patient. Potential complications include bleeding and infection.  Alcohol was used to prep the area. Ethyl chloride spray was used as a topical anesthetic.  Patient was positioned in a seated position with shoulder adducted and internally rotated.   Using sterile technique, a 22 gauge spinal needle injection needle was used to inject 5mL of lidocaine and 2mL of kenalog directed from a lateral aspect into the shoulder joint.   A bandage was applied.  The patient tolerated the procedure well.   Patient instructed to avoid strenuous activity for 2 day(s).  Specifically counseled regarding the signs and symptoms of potential infection to present promptly to clinic or hospital if such signs and symptoms arise.  [Right] SUBACROMIAL SHOULDER INJECTION   Indication:  subacromial bursitis/impingement, pain   Risk, benefits and alternatives were discussed with the patient. Potential complications include bleeding and infection. Alcohol was used to prep the area.  Ethyl chloride spray was used as a topical anesthetic.  Using sterile technique, the injection needle was then directed from a standard posterior approach parallel to and inferior to the acromion. A 21g needle was used to inject 5 mL of 1% Lidocaine and 2 mL Kenalog.  No significant resistance was encountered.   A bandage was applied.  The patient tolerated the procedure well.     Patient instructed to avoid strenuous activity for 2 day(s). Specifically counseled regarding the signs and symptoms of potential infection and instructed to present promptly to clinic or hospital if such signs and symptoms arise.   The patient was adequately and thoroughly informed of my assessment of their current condition(s).  DISCUSSION: 1.  Right shoulder joint injection as well as subacromial injection as requested.  Continue PT 2.  She elects to proceed with left wrist partial scaphoid ectomy with osseous growth removal, de Quervain's release, styloidectomy of radius and AIN PIN neurectomy 3. [x]

## 2024-01-25 ENCOUNTER — OUTPATIENT (OUTPATIENT)
Dept: OUTPATIENT SERVICES | Facility: HOSPITAL | Age: 69
LOS: 1 days | End: 2024-01-25
Payer: MEDICARE

## 2024-01-25 DIAGNOSIS — Z01.818 ENCOUNTER FOR OTHER PREPROCEDURAL EXAMINATION: ICD-10-CM

## 2024-01-25 LAB
A1C WITH ESTIMATED AVERAGE GLUCOSE RESULT: 5.9 % — HIGH (ref 4–5.6)
ALBUMIN SERPL ELPH-MCNC: 4.4 G/DL — SIGNIFICANT CHANGE UP (ref 3.3–5.2)
ALP SERPL-CCNC: 79 U/L — SIGNIFICANT CHANGE UP (ref 40–120)
ALT FLD-CCNC: 14 U/L — SIGNIFICANT CHANGE UP
ANION GAP SERPL CALC-SCNC: 12 MMOL/L — SIGNIFICANT CHANGE UP (ref 5–17)
AST SERPL-CCNC: 20 U/L — SIGNIFICANT CHANGE UP
BASOPHILS # BLD AUTO: 0.07 K/UL — SIGNIFICANT CHANGE UP (ref 0–0.2)
BASOPHILS NFR BLD AUTO: 0.9 % — SIGNIFICANT CHANGE UP (ref 0–2)
BILIRUB SERPL-MCNC: <0.2 MG/DL — LOW (ref 0.4–2)
BUN SERPL-MCNC: 17.9 MG/DL — SIGNIFICANT CHANGE UP (ref 8–20)
CALCIUM SERPL-MCNC: 9.4 MG/DL — SIGNIFICANT CHANGE UP (ref 8.4–10.5)
CHLORIDE SERPL-SCNC: 101 MMOL/L — SIGNIFICANT CHANGE UP (ref 96–108)
CO2 SERPL-SCNC: 28 MMOL/L — SIGNIFICANT CHANGE UP (ref 22–29)
CREAT SERPL-MCNC: 0.94 MG/DL — SIGNIFICANT CHANGE UP (ref 0.5–1.3)
EGFR: 67 ML/MIN/1.73M2 — SIGNIFICANT CHANGE UP
EOSINOPHIL # BLD AUTO: 0.04 K/UL — SIGNIFICANT CHANGE UP (ref 0–0.5)
EOSINOPHIL NFR BLD AUTO: 0.5 % — SIGNIFICANT CHANGE UP (ref 0–6)
ESTIMATED AVERAGE GLUCOSE: 123 MG/DL — HIGH (ref 68–114)
GLUCOSE SERPL-MCNC: 87 MG/DL — SIGNIFICANT CHANGE UP (ref 70–99)
HCT VFR BLD CALC: 34.1 % — LOW (ref 34.5–45)
HGB BLD-MCNC: 10.9 G/DL — LOW (ref 11.5–15.5)
IMM GRANULOCYTES NFR BLD AUTO: 0.5 % — SIGNIFICANT CHANGE UP (ref 0–0.9)
LYMPHOCYTES # BLD AUTO: 1.82 K/UL — SIGNIFICANT CHANGE UP (ref 1–3.3)
LYMPHOCYTES # BLD AUTO: 23.8 % — SIGNIFICANT CHANGE UP (ref 13–44)
MCHC RBC-ENTMCNC: 27.1 PG — SIGNIFICANT CHANGE UP (ref 27–34)
MCHC RBC-ENTMCNC: 32 GM/DL — SIGNIFICANT CHANGE UP (ref 32–36)
MCV RBC AUTO: 84.8 FL — SIGNIFICANT CHANGE UP (ref 80–100)
MONOCYTES # BLD AUTO: 0.64 K/UL — SIGNIFICANT CHANGE UP (ref 0–0.9)
MONOCYTES NFR BLD AUTO: 8.4 % — SIGNIFICANT CHANGE UP (ref 2–14)
NEUTROPHILS # BLD AUTO: 5.03 K/UL — SIGNIFICANT CHANGE UP (ref 1.8–7.4)
NEUTROPHILS NFR BLD AUTO: 65.9 % — SIGNIFICANT CHANGE UP (ref 43–77)
PLATELET # BLD AUTO: 360 K/UL — SIGNIFICANT CHANGE UP (ref 150–400)
POTASSIUM SERPL-MCNC: 4.1 MMOL/L — SIGNIFICANT CHANGE UP (ref 3.5–5.3)
POTASSIUM SERPL-SCNC: 4.1 MMOL/L — SIGNIFICANT CHANGE UP (ref 3.5–5.3)
PROT SERPL-MCNC: 7.4 G/DL — SIGNIFICANT CHANGE UP (ref 6.6–8.7)
RBC # BLD: 4.02 M/UL — SIGNIFICANT CHANGE UP (ref 3.8–5.2)
RBC # FLD: 14.8 % — HIGH (ref 10.3–14.5)
SODIUM SERPL-SCNC: 141 MMOL/L — SIGNIFICANT CHANGE UP (ref 135–145)
WBC # BLD: 7.64 K/UL — SIGNIFICANT CHANGE UP (ref 3.8–10.5)
WBC # FLD AUTO: 7.64 K/UL — SIGNIFICANT CHANGE UP (ref 3.8–10.5)

## 2024-01-25 PROCEDURE — 93005 ELECTROCARDIOGRAM TRACING: CPT

## 2024-01-25 PROCEDURE — 85025 COMPLETE CBC W/AUTO DIFF WBC: CPT

## 2024-01-25 PROCEDURE — 83036 HEMOGLOBIN GLYCOSYLATED A1C: CPT

## 2024-01-25 PROCEDURE — 80053 COMPREHEN METABOLIC PANEL: CPT

## 2024-01-25 PROCEDURE — 93010 ELECTROCARDIOGRAM REPORT: CPT

## 2024-01-25 PROCEDURE — 36415 COLL VENOUS BLD VENIPUNCTURE: CPT

## 2024-01-25 PROCEDURE — G0463: CPT

## 2024-01-29 ENCOUNTER — APPOINTMENT (OUTPATIENT)
Dept: ORTHOPEDIC SURGERY | Facility: AMBULATORY SURGERY CENTER | Age: 69
End: 2024-01-29
Payer: MEDICARE

## 2024-01-29 PROCEDURE — 25390 SHORTEN RADIUS OR ULNA: CPT | Mod: LT

## 2024-01-29 PROCEDURE — 25210 REMOVAL OF WRIST BONE: CPT | Mod: LT

## 2024-01-29 PROCEDURE — 64772 INCISION OF SPINAL NERVE: CPT | Mod: 59,LT

## 2024-01-29 RX ORDER — OXYCODONE 5 MG/1
5 TABLET ORAL
Qty: 12 | Refills: 0 | Status: ACTIVE | COMMUNITY
Start: 2024-01-29 | End: 1900-01-01

## 2024-01-29 RX ORDER — MELOXICAM 15 MG/1
15 TABLET ORAL DAILY
Qty: 14 | Refills: 0 | Status: ACTIVE | COMMUNITY
Start: 2024-01-29 | End: 1900-01-01

## 2024-02-14 ENCOUNTER — APPOINTMENT (OUTPATIENT)
Dept: ORTHOPEDIC SURGERY | Facility: CLINIC | Age: 69
End: 2024-02-14
Payer: MEDICARE

## 2024-02-14 DIAGNOSIS — M65.4 RADIAL STYLOID TENOSYNOVITIS [DE QUERVAIN]: ICD-10-CM

## 2024-02-14 PROCEDURE — 99024 POSTOP FOLLOW-UP VISIT: CPT

## 2024-02-14 RX ORDER — SULFAMETHOXAZOLE AND TRIMETHOPRIM 800; 160 MG/1; MG/1
800-160 TABLET ORAL TWICE DAILY
Qty: 14 | Refills: 0 | Status: ACTIVE | COMMUNITY
Start: 2024-02-14 | End: 1900-01-01

## 2024-02-14 NOTE — HISTORY OF PRESENT ILLNESS
[___ Weeks Post Op] : [unfilled] weeks post op [de-identified] : POS: Left scaphoid osseous growth partial carpectomy, distal radius styloid osteotomy and de Quervain's release with anterior and posterior interosseous nerve neurectomy x2.  DOS: 01/29/2024 [de-identified] : Returns for follow up.  She denies pain and overall she is doing well. [de-identified] : The incisions are healing well.  There is slight maceration over the proximal incision however no signs of drainage or discomfort upon compression.  She has good range of motion with some stiffness as expected.  No erythema or other signs of infection [de-identified] : She is doing well at this point I would like for her to commence OT to further optimize her outcome.  Due to slight maceration we will send an antibiotic precautionary. [de-identified] : 1. Bactrim as above. 2. Commence OT. Follow up in 6 weeks.

## 2024-03-25 RX ORDER — MELOXICAM 15 MG/1
15 TABLET ORAL DAILY
Qty: 14 | Refills: 0 | Status: ACTIVE | COMMUNITY
Start: 2024-01-26 | End: 1900-01-01

## 2024-03-27 ENCOUNTER — APPOINTMENT (OUTPATIENT)
Dept: ORTHOPEDIC SURGERY | Facility: CLINIC | Age: 69
End: 2024-03-27
Payer: MEDICARE

## 2024-03-27 DIAGNOSIS — M19.019 PRIMARY OSTEOARTHRITIS, UNSPECIFIED SHOULDER: ICD-10-CM

## 2024-03-27 DIAGNOSIS — M25.532 PAIN IN LEFT WRIST: ICD-10-CM

## 2024-03-27 DIAGNOSIS — M19.039 PRIMARY OSTEOARTHRITIS, UNSPECIFIED WRIST: ICD-10-CM

## 2024-03-27 DIAGNOSIS — M25.511 PAIN IN RIGHT SHOULDER: ICD-10-CM

## 2024-03-27 DIAGNOSIS — G89.29 PAIN IN RIGHT SHOULDER: ICD-10-CM

## 2024-03-27 PROCEDURE — 20610 DRAIN/INJ JOINT/BURSA W/O US: CPT | Mod: 79,RT

## 2024-03-27 PROCEDURE — 99214 OFFICE O/P EST MOD 30 MIN: CPT | Mod: 24,25

## 2024-03-27 RX ORDER — METHYLPREDNISOLONE 4 MG/1
4 TABLET ORAL
Qty: 1 | Refills: 0 | Status: ACTIVE | COMMUNITY
Start: 2024-03-27 | End: 1900-01-01

## 2024-05-14 ENCOUNTER — APPOINTMENT (OUTPATIENT)
Dept: ORTHOPEDIC SURGERY | Facility: CLINIC | Age: 69
End: 2024-05-14
Payer: MEDICARE

## 2024-05-14 DIAGNOSIS — M75.90 BURSITIS OF UNSPECIFIED SHOULDER: ICD-10-CM

## 2024-05-14 DIAGNOSIS — M25.511 PAIN IN RIGHT SHOULDER: ICD-10-CM

## 2024-05-14 DIAGNOSIS — M75.50 BURSITIS OF UNSPECIFIED SHOULDER: ICD-10-CM

## 2024-05-14 PROCEDURE — 20610 DRAIN/INJ JOINT/BURSA W/O US: CPT | Mod: RT

## 2024-05-14 PROCEDURE — 99214 OFFICE O/P EST MOD 30 MIN: CPT | Mod: 25

## 2024-05-14 RX ORDER — MELOXICAM 15 MG/1
15 TABLET ORAL DAILY
Qty: 14 | Refills: 0 | Status: ACTIVE | COMMUNITY
Start: 2024-05-14 | End: 1900-01-01

## 2024-05-14 NOTE — ASSESSMENT
[FreeTextEntry1] : ASSESSMENT: The patient comes in today  for follow up s/p left scaphoid osseous growth partial carpectomy, distal radius styloid osteotomy and de Quervain's release with anterior and posterior interosseous nerve neurectomy x2 on 1/29/24. Separately, she also presents for follow-up for chronic exacerbated right shoulder pain.  The symptoms are affecting her overhead activities as well as her ADLs.  She reports some relief after her right shoulder injection last visit, however symptoms have returned.  For her right shoulder symptoms, treatment modalities were discussed, the patient elects for a repeat injection.  I once again also recommend physical therapy for her condition.   The patient was adequately and thoroughly informed of my assessment of their current condition(s).  - This may diminish bodily function for the extremity.  We discussed prognosis, treatment modalities including operative and nonoperative options for the above diagnostic assessment. As always, 2nd opinion is always provided as an option. For this, when accessible, I was able to review other physicians note(s) including reviewing other tests, imaging results as well as personally view these results for my own interpretation.      [Right] SUBACROMIAL SHOULDER INJECTION     Indication:  subacromial bursitis/impingement, pain     Risk, benefits and alternatives were discussed with the patient. Potential complications include bleeding and infection. Alcohol was used to prep the area. Ethyl chloride spray was used as a topical anesthetic.  Using sterile technique, the injection needle was then directed from a standard posterior approach parallel to and inferior to the acromion.  A 21g needle was used to inject 5 mL of 1% Lidocaine and 2 mL Kenalog (10mg). No significant resistance was encountered. A bandage was applied. The patient tolerated the procedure well. Patient instructed to avoid strenuous activity for 2 day(s). Specifically counseled regarding the signs and symptoms of potential infection and instructed to present promptly to clinic or hospital if such signs and symptoms arise.   The patient was adequately and thoroughly informed of my assessment of their current condition(s).   A prescription for meloxicam was given today. The patient was instructed to take this with food and discontinue other NSAIDs while taking this. The risks, benefits and black box warnings were discussed. The patient was instructed to discontinue the medication if it began hurting their stomach.   DISCUSSION: 1.  Meloxicam as above as requested.  Right shoulder injection.  Activity modifications.  HEP for right shoulder symptoms.  Follow-up in 2 months. 2. [x] 3. [x]

## 2024-05-14 NOTE — PHYSICAL EXAM
[de-identified] : Examination of the [right] shoulder reveals equal active and passive motion as compared to the contralateral side There is a positive Speed, positive Luis, positive Travis, tenderness with anterior shoulder compression. 3+/5 strength  Examination of the left hand and wrist reveal incisions that have healed beautifully.  She is able to make a full composite fist with the left hand without discomfort.  There is very minimal stiffness and swelling.

## 2024-05-14 NOTE — HISTORY OF PRESENT ILLNESS
[FreeTextEntry1] : Keren is a 68-year-old female who returns today for follow up s/p left scaphoid osseous growth partial carpectomy, distal radius styloid osteotomy and de Quervain's release with anterior and posterior interosseous nerve neurectomy x2 on 1/29/24. Separately, she also presents for follow-up for chronic exacerbated right shoulder pain.  The symptoms are affecting her overhead activities as well as her ADLs.  She reports some relief after her right shoulder injection last visit, however symptoms have returned.

## 2024-10-03 NOTE — ED ADULT NURSE REASSESSMENT NOTE - NEURO ASSESSMENT
Pt refused to answer orientation questions -unable to assess, on RA, afebrile, and pt remained in bed and repositioned self. Irritable affect.  No c/o n/v/d or pain.  Fair appetite, voiding well, no BM during shift.  Pt refused assessment and medications.1:1 in person sitter present throughout shift.     Frequently monitored during shift. Fall precautions in place and call light within reach. All care endorsed to oncoming RN.  
- - -

## 2024-12-07 ENCOUNTER — EMERGENCY (EMERGENCY)
Facility: HOSPITAL | Age: 69
LOS: 1 days | Discharge: HOME | End: 2024-12-07
Attending: EMERGENCY MEDICINE
Payer: MEDICARE

## 2024-12-07 VITALS
HEIGHT: 66 IN | TEMPERATURE: 98 F | RESPIRATION RATE: 18 BRPM | SYSTOLIC BLOOD PRESSURE: 135 MMHG | OXYGEN SATURATION: 98 % | HEART RATE: 68 BPM | DIASTOLIC BLOOD PRESSURE: 70 MMHG | WEIGHT: 190.04 LBS

## 2024-12-07 VITALS — DIASTOLIC BLOOD PRESSURE: 74 MMHG | HEART RATE: 76 BPM | SYSTOLIC BLOOD PRESSURE: 110 MMHG

## 2024-12-07 LAB
ALBUMIN SERPL ELPH-MCNC: 4.2 G/DL — SIGNIFICANT CHANGE UP (ref 3.3–5.2)
ALP SERPL-CCNC: 93 U/L — SIGNIFICANT CHANGE UP (ref 40–120)
ALT FLD-CCNC: 13 U/L — SIGNIFICANT CHANGE UP
ANION GAP SERPL CALC-SCNC: 11 MMOL/L — SIGNIFICANT CHANGE UP (ref 5–17)
AST SERPL-CCNC: 25 U/L — SIGNIFICANT CHANGE UP
BASOPHILS # BLD AUTO: 0.05 K/UL — SIGNIFICANT CHANGE UP (ref 0–0.2)
BASOPHILS NFR BLD AUTO: 0.7 % — SIGNIFICANT CHANGE UP (ref 0–2)
BILIRUB SERPL-MCNC: 0.3 MG/DL — LOW (ref 0.4–2)
BUN SERPL-MCNC: 14.7 MG/DL — SIGNIFICANT CHANGE UP (ref 8–20)
CALCIUM SERPL-MCNC: 9.2 MG/DL — SIGNIFICANT CHANGE UP (ref 8.4–10.5)
CHLORIDE SERPL-SCNC: 106 MMOL/L — SIGNIFICANT CHANGE UP (ref 96–108)
CO2 SERPL-SCNC: 23 MMOL/L — SIGNIFICANT CHANGE UP (ref 22–29)
CREAT SERPL-MCNC: 0.6 MG/DL — SIGNIFICANT CHANGE UP (ref 0.5–1.3)
EGFR: 99 ML/MIN/1.73M2 — SIGNIFICANT CHANGE UP
EOSINOPHIL # BLD AUTO: 0.07 K/UL — SIGNIFICANT CHANGE UP (ref 0–0.5)
EOSINOPHIL NFR BLD AUTO: 1 % — SIGNIFICANT CHANGE UP (ref 0–6)
GLUCOSE SERPL-MCNC: 113 MG/DL — HIGH (ref 70–99)
HCT VFR BLD CALC: 30.1 % — LOW (ref 34.5–45)
HGB BLD-MCNC: 9.4 G/DL — LOW (ref 11.5–15.5)
IMM GRANULOCYTES NFR BLD AUTO: 0.3 % — SIGNIFICANT CHANGE UP (ref 0–0.9)
LYMPHOCYTES # BLD AUTO: 1.15 K/UL — SIGNIFICANT CHANGE UP (ref 1–3.3)
LYMPHOCYTES # BLD AUTO: 17.1 % — SIGNIFICANT CHANGE UP (ref 13–44)
MCHC RBC-ENTMCNC: 26.3 PG — LOW (ref 27–34)
MCHC RBC-ENTMCNC: 31.2 G/DL — LOW (ref 32–36)
MCV RBC AUTO: 84.1 FL — SIGNIFICANT CHANGE UP (ref 80–100)
MONOCYTES # BLD AUTO: 0.4 K/UL — SIGNIFICANT CHANGE UP (ref 0–0.9)
MONOCYTES NFR BLD AUTO: 5.9 % — SIGNIFICANT CHANGE UP (ref 2–14)
NEUTROPHILS # BLD AUTO: 5.04 K/UL — SIGNIFICANT CHANGE UP (ref 1.8–7.4)
NEUTROPHILS NFR BLD AUTO: 75 % — SIGNIFICANT CHANGE UP (ref 43–77)
PLATELET # BLD AUTO: 354 K/UL — SIGNIFICANT CHANGE UP (ref 150–400)
POTASSIUM SERPL-MCNC: 4.1 MMOL/L — SIGNIFICANT CHANGE UP (ref 3.5–5.3)
POTASSIUM SERPL-SCNC: 4.1 MMOL/L — SIGNIFICANT CHANGE UP (ref 3.5–5.3)
PROT SERPL-MCNC: 7.4 G/DL — SIGNIFICANT CHANGE UP (ref 6.6–8.7)
RBC # BLD: 3.58 M/UL — LOW (ref 3.8–5.2)
RBC # FLD: 16.2 % — HIGH (ref 10.3–14.5)
SODIUM SERPL-SCNC: 140 MMOL/L — SIGNIFICANT CHANGE UP (ref 135–145)
WBC # BLD: 6.73 K/UL — SIGNIFICANT CHANGE UP (ref 3.8–10.5)
WBC # FLD AUTO: 6.73 K/UL — SIGNIFICANT CHANGE UP (ref 3.8–10.5)

## 2024-12-07 PROCEDURE — 70450 CT HEAD/BRAIN W/O DYE: CPT | Mod: 26,MC

## 2024-12-07 PROCEDURE — 96365 THER/PROPH/DIAG IV INF INIT: CPT

## 2024-12-07 PROCEDURE — 93010 ELECTROCARDIOGRAM REPORT: CPT

## 2024-12-07 PROCEDURE — 96375 TX/PRO/DX INJ NEW DRUG ADDON: CPT

## 2024-12-07 PROCEDURE — 70450 CT HEAD/BRAIN W/O DYE: CPT | Mod: MC

## 2024-12-07 PROCEDURE — 36415 COLL VENOUS BLD VENIPUNCTURE: CPT

## 2024-12-07 PROCEDURE — 99284 EMERGENCY DEPT VISIT MOD MDM: CPT

## 2024-12-07 PROCEDURE — 93005 ELECTROCARDIOGRAM TRACING: CPT

## 2024-12-07 PROCEDURE — 99285 EMERGENCY DEPT VISIT HI MDM: CPT | Mod: 25

## 2024-12-07 PROCEDURE — 85025 COMPLETE CBC W/AUTO DIFF WBC: CPT

## 2024-12-07 PROCEDURE — 80053 COMPREHEN METABOLIC PANEL: CPT

## 2024-12-07 RX ORDER — METOCLOPRAMIDE HYDROCHLORIDE 10 MG/1
10 TABLET ORAL ONCE
Refills: 0 | Status: COMPLETED | OUTPATIENT
Start: 2024-12-07 | End: 2024-12-07

## 2024-12-07 RX ORDER — ACETAMINOPHEN 500MG 500 MG/1
1000 TABLET, COATED ORAL ONCE
Refills: 0 | Status: COMPLETED | OUTPATIENT
Start: 2024-12-07 | End: 2024-12-07

## 2024-12-07 RX ADMIN — ACETAMINOPHEN 500MG 400 MILLIGRAM(S): 500 TABLET, COATED ORAL at 11:15

## 2024-12-07 RX ADMIN — METOCLOPRAMIDE HYDROCHLORIDE 10 MILLIGRAM(S): 10 TABLET ORAL at 13:15

## 2024-12-07 RX ADMIN — ACETAMINOPHEN 500MG 1000 MILLIGRAM(S): 500 TABLET, COATED ORAL at 11:35

## 2024-12-07 RX ADMIN — ACETAMINOPHEN 500MG 1000 MILLIGRAM(S): 500 TABLET, COATED ORAL at 12:38

## 2024-12-07 NOTE — ED PROVIDER NOTE - NSFOLLOWUPINSTRUCTIONS_ED_ALL_ED_FT
- Please take all medications as prescribed   - Please make an appointment with your primary care physician within the next 2-3 days and bring all your results with you   - If your symptoms worsen, please return to ED for evaluation  - Please follow up with your neurologist, neurosurgeon and ophthalmologist   - Take 500mg - 1000mg of Tylenol (acetaminophen) with food every 6 hours for 3 days to reduce inflammation and treat your pain.  After that, you may take as needed - please follow the directions on the packaging.  Standard regular strength Tylenol is 500mg, therefore take 1-2 pills per dose. Do not take more than 4,000mg per day.      Meningioma    WHAT YOU NEED TO KNOW:    What is a meningioma? A meningioma is a tumor that starts in the meninges of the brain and spinal cord. The meninges are the tissues that cover the brain and spinal cord. They prevent germs and other substances from entering the brain and spinal cord. Most meningiomas are slow-growing and benign (not cancer).    What increases my risk for a meningioma?    Cancer, especially breast cancer    A family history of meningiomas, cancer, or neurofibromatosis type 2    Increased levels of female sex hormones, such as estrogen    A skull fracture    Radiation treatment or exposure  What are the signs and symptoms of a meningioma? Signs and symptoms depend on the size and location of the tumor. You may have no symptoms, or you may have any of the following:    Headaches, especially right after you wake up    Nausea and vomiting    Weakness or inability to move a limb    Personality or behavior changes    Memory loss    Trouble speaking or changes in vision    Seizures  How is a meningioma diagnosed? Your healthcare provider will ask about your health history, and the health history of family members. An x-ray, ultrasound, CT, or MRI will show the location of the tumor. You may be given contrast liquid to help the tumor show up better in the pictures. Tell the healthcare provider if you have ever had an allergic reaction to contrast liquid. Do not enter the MRI room with anything metal. Metal can cause serious injury. Tell the healthcare provider if you have any metal in or on your body.    How is a meningioma treated? Treatment depends on the location and size of the tumor, and your signs and symptoms. Your healthcare provider may recommend that you have regular tests and follow-up visits to watch for changes. You may also need any of the following:    Surgery may be done to remove the tumor.    Endovascular embolization is a procedure to reduce or stop blood flow to the meningioma and decrease its size. Your healthcare provider will insert a catheter into a blood vessel that goes to your brain. When the artery that is supplying the meningioma is reached, a coil or glue is injected to block blood flow to the tumor.    Hormone therapy may help to decrease the size of tumors that need estrogen for growth.    Radiation therapy uses high energy x-ray beams to kill tumor cells and decrease the size of the tumor.    Chemotherapy is used to kill tumor cells.  What can I do to manage my symptoms?    Drink liquids as directed. Ask how much liquid to drink each day and which liquids are best for you. If you have nausea or diarrhea from treatment, extra liquids may help decrease your risk for dehydration.    Eat healthy foods. Healthy foods include fruits, vegetables, whole-grain breads, low-fat dairy products, beans, lean meats, and fish. This may help you feel better during treatment and decrease side effects. You may need to change what you eat during treatment. Do not eat foods or drink liquids that cause gas, such as cabbage, beans, onions, or soft drinks. A nutritionist may help to plan the best meals and snacks for you.  Healthy Foods      Be physically active, as directed. Exercise may improve your energy levels and appetite. Your healthcare provider can help you create a physical activity plan.   Family Walking for Exercise  Call your local emergency number (911 in the [place]) if:    You have any of the following signs of a stroke:  Numbness or drooping on one side of your face    Weakness in an arm or leg    Confusion or difficulty speaking    Dizziness, a severe headache, or vision loss    When should I seek immediate care?    You vomit repeatedly, and cannot keep any food or liquids down.    You have a severe headache, or you feel dizzy.  When should I call my doctor?    You have a fever.    You have questions or concerns about your condition or care.

## 2024-12-07 NOTE — ED ADULT NURSE NOTE - NSFALLRISKINTERV_ED_ALL_ED
Assistance OOB with selected safe patient handling equipment if applicable/Assistance with ambulation/Communicate fall risk and risk factors to all staff, patient, and family/Monitor gait and stability/Provide visual cue: yellow wristband, yellow gown, etc/Reinforce activity limits and safety measures with patient and family/Call bell, personal items and telephone in reach/Instruct patient to call for assistance before getting out of bed/chair/stretcher/Non-slip footwear applied when patient is off stretcher/[place] to call system/Physically safe environment - no spills, clutter or unnecessary equipment/Purposeful Proactive Rounding/Room/bathroom lighting operational, light cord in reach

## 2024-12-07 NOTE — ED PROVIDER NOTE - CLINICAL SUMMARY MEDICAL DECISION MAKING FREE TEXT BOX
69yo F with PMH of vertigo, mood disorder, asthma, meningioma, HTN, prediabetes presenting with worsening blurry vision and unsteady gait over the last week. PT endorsing intermittent nausea and paresthesias in her fingers and toes especially at night. Meningioma last imaged 11/2023 with MRI demonstrating grossly unchanged appearing calcified meningioma off the left posteromedial orbital roof when compared with 4/18/2023. Pt endorsing nausea and paresthesias in her fingers and toes. On exam pt HDS, RRR, breath sounds clear, EOM intact, AOX3, strength and sensation intact in upper and lower extremities, FTN unremarkable, pt with shuffling careful gait, unsteady when turning. Ordered basic labs, CT head non contrast. 67yo F with PMH of vertigo, mood disorder, asthma, meningioma, HTN, prediabetes presenting with worsening blurry vision and unsteady gait over the last week. PT endorsing intermittent nausea and paresthesias in her fingers and toes especially at night. Meningioma last imaged 11/2023 with MRI demonstrating grossly unchanged appearing calcified meningioma off the left posteromedial orbital roof when compared with 4/18/2023. Pt endorsing nausea and paresthesias in her fingers and toes. On exam pt HDS, RRR, breath sounds clear, EOM intact, AOX3, strength and sensation intact in upper and lower extremities, FTN unremarkable, pt with shuffling careful gait, unsteady when turning. Ordered basic labs, CT head non contrast. Labs grossly WNL. Meningioma stable in repeat CT. Pt to be discharged with optho and neuro follow up.

## 2024-12-07 NOTE — ED ADULT NURSE NOTE - OBJECTIVE STATEMENT
Pt presents to ED because pt states she has a diagnosis of meningioma x 1 year and for the past 1  month pt states her symptoms are getting worse. Pt denies pain at this time but pt states when her headaches comes she has 5/10 pain, pt states her gait has become more unsteady and experiencing intermittent blurry vision and shuffling gait. Pt denies any falls and pt denies n,v,d,chest pain/sob. Pt states at home she takes, meclazine, sertraline, losartan and meloxicam. Pt A&Ox3, sitting up comfortably in bed.

## 2024-12-07 NOTE — ED PROVIDER NOTE - CARE PROVIDER_API CALL
Miller Teixeira  Neurology  [address], [address]  [place] [address]  Phone: [phone]  Fax: [phone]  Follow Up Time: Routine    Kvng Wallace  Ophthalmology  [address], [address]  [place] [address]  Phone: [phone]  Fax: [phone]  Follow Up Time: Routine    Estella Majano  Neurosurgery  [address] [place] [id number]  Phone: [phone]  Fax: [phone]  Follow Up Time: Routine

## 2024-12-07 NOTE — ED PROVIDER NOTE - CHIEF COMPLAINT
"""Informed patient that their capsular opacification is visually significant and meets the minimum "" The patient is a 68y Female complaining of unsteady gait.

## 2024-12-07 NOTE — ED PROVIDER NOTE - PATIENT PORTAL LINK FT
You can access the FollowMyHealth Patient Portal offered by [facility] by registering at the following website: [email or web address]. By joining [employer]’s FollowMyHealth portal, you will also be able to view your health information using other applications (apps) compatible with our system.

## 2024-12-07 NOTE — ED ADULT TRIAGE NOTE - CHIEF COMPLAINT QUOTE
pt BIBA from home for vision change and increased headache and diff ambulating x 1 month. pt states she was dx with a benign brain tumor recently  and was told to come back to ED if she had symptoms. AOX3, no distress.

## 2024-12-07 NOTE — ED PROVIDER NOTE - CARE PROVIDERS DIRECT ADDRESSES
,[email or web address],DirectAddress_Unknown,DirectAddress_Unknown

## 2024-12-07 NOTE — ED PROVIDER NOTE - PROVIDER TOKENS
PROVIDER:[TOKEN:[[id number]],FOLLOWUP:[Routine]],PROVIDER:[TOKEN:[[id number]],FOLLOWUP:[Routine]],PROVIDER:[TOKEN:[[id number]],FOLLOWUP:[Routine]]

## 2024-12-07 NOTE — ED PROVIDER NOTE - NSICDXPASTMEDICALHX_GEN_ALL_CORE_FT
PAST MEDICAL HISTORY:  HTN (hypertension)     Meningioma     Mood disorder due to Menopause    Vertigo

## 2024-12-07 NOTE — ED PROVIDER NOTE - OBJECTIVE STATEMENT
69yo F with PMH of vertigo, mood disorder, asthma, meningioma, HTN, prediabetes pressenting with worsening blurry vision and unsteady gait over the last week. Pt reports for the last month she Has been having intermittent headaches, blood present as a sharp stabbing pain and will resolve.  Patient also reporting intermittent blurry vision, but is still able to read and use her phone.  More of a shuffling gait over the last month and has been feeling more unbalanced. 69yo F with PMH of vertigo, mood disorder, asthma, meningioma, HTN, prediabetes presenting with worsening blurry vision and unsteady gait over the last week. Pt reports for the last month she Has been having intermittent headaches, blood present as a sharp stabbing pain and will resolve.  Patient also reporting intermittent blurry vision, but is still able to read and use her phone.  More of a shuffling gait over the last month and has been feeling more unbalanced. Pt last saw her neurosurgeon about 6 months ago but needs to schedule a follow up visit. Meningioma last imaged 11/2023 with MRI demonstrating grossly unchanged appearing calcified meningioma off the left   posteromedial orbital roof when compared with 4/18/2023. Pt endorsing nausea and paresthesias in her fingers and toes.  Pt denies fevers, chills, vomiting,  tinnitus, rhinorrhea, cough, shortness of breath, chest pain, abdominal pain, constipation, diarrhea, hematuria, dysuria, frequency, urgency, numbness, weakness.

## 2025-06-19 ENCOUNTER — APPOINTMENT (OUTPATIENT)
Dept: OBGYN | Facility: CLINIC | Age: 70
End: 2025-06-19